# Patient Record
Sex: MALE | Race: WHITE | NOT HISPANIC OR LATINO | Employment: FULL TIME | URBAN - METROPOLITAN AREA
[De-identification: names, ages, dates, MRNs, and addresses within clinical notes are randomized per-mention and may not be internally consistent; named-entity substitution may affect disease eponyms.]

---

## 2017-01-05 ENCOUNTER — OFFICE VISIT (OUTPATIENT)
Dept: FAMILY MEDICINE CLINIC | Facility: CLINIC | Age: 47
End: 2017-01-05

## 2017-01-05 VITALS
HEART RATE: 80 BPM | SYSTOLIC BLOOD PRESSURE: 122 MMHG | TEMPERATURE: 97.9 F | OXYGEN SATURATION: 99 % | BODY MASS INDEX: 42.09 KG/M2 | HEIGHT: 70 IN | DIASTOLIC BLOOD PRESSURE: 70 MMHG | WEIGHT: 294 LBS

## 2017-01-05 DIAGNOSIS — I48.91 ATRIAL FIBRILLATION, UNSPECIFIED TYPE (HCC): Primary | ICD-10-CM

## 2017-01-05 DIAGNOSIS — E66.01 MORBID OBESITY DUE TO EXCESS CALORIES (HCC): ICD-10-CM

## 2017-01-05 DIAGNOSIS — I10 ESSENTIAL HYPERTENSION: ICD-10-CM

## 2017-01-05 DIAGNOSIS — G47.33 OBSTRUCTIVE SLEEP APNEA: ICD-10-CM

## 2017-01-05 DIAGNOSIS — R73.09 ELEVATED HEMOGLOBIN A1C: ICD-10-CM

## 2017-01-05 DIAGNOSIS — Z79.899 HIGH RISK MEDICATION USE: ICD-10-CM

## 2017-01-05 DIAGNOSIS — F41.1 GENERALIZED ANXIETY DISORDER: ICD-10-CM

## 2017-01-05 DIAGNOSIS — Z98.890 HISTORY OF RADIOFREQUENCY ABLATION PROCEDURE FOR CARDIAC ARRHYTHMIA: ICD-10-CM

## 2017-01-05 DIAGNOSIS — E88.81 METABOLIC SYNDROME: ICD-10-CM

## 2017-01-05 PROBLEM — F41.9 ANXIETY: Status: ACTIVE | Noted: 2017-01-05

## 2017-01-05 PROBLEM — E88.810 METABOLIC SYNDROME: Status: ACTIVE | Noted: 2017-01-05

## 2017-01-05 PROCEDURE — 99204 OFFICE O/P NEW MOD 45 MIN: CPT | Performed by: NURSE PRACTITIONER

## 2017-01-05 RX ORDER — APIXABAN 5 MG/1
TABLET, FILM COATED ORAL
Refills: 3 | COMMUNITY
Start: 2016-12-21 | End: 2017-07-03

## 2017-01-05 RX ORDER — DULOXETIN HYDROCHLORIDE 30 MG/1
CAPSULE, DELAYED RELEASE ORAL
Refills: 5 | COMMUNITY
Start: 2017-01-03 | End: 2017-07-03 | Stop reason: SDUPTHER

## 2017-01-05 RX ORDER — FLECAINIDE ACETATE 100 MG/1
TABLET ORAL
Refills: 3 | COMMUNITY
Start: 2017-01-03 | End: 2018-03-08 | Stop reason: SDUPTHER

## 2017-01-05 RX ORDER — DIGOXIN 250 UG/1
TABLET ORAL
Refills: 4 | COMMUNITY
Start: 2017-01-03 | End: 2017-02-17

## 2017-01-05 RX ORDER — METOPROLOL SUCCINATE 100 MG/1
TABLET, EXTENDED RELEASE ORAL
Refills: 5 | COMMUNITY
Start: 2016-12-30 | End: 2017-06-05 | Stop reason: SDUPTHER

## 2017-01-05 NOTE — PROGRESS NOTES
Subjective   Logan Landa is a 46 y.o. male.     HPI Comments: Pleasant gentleman history of atrial fibrillation  Ablation over the summer  He since has moved to [place]  Compliant with his medications  But needs a cardiologist to follow up with    Presently no chest pain no shortness of breath no rapid heart rate irregular heart rate or other problems.  He is doing well with medication and has assess with normal sinus rhythm after the ablation      Blood pressure controlled    He has anxiety most of his life  Especially last 20 years presently controlled on duloxetine  Previously Effexor     He's had some erectile problems over the last several years, prior to duloxetine and metoprolol    Saw urology testosterone checked was good    Sleep apnea is treated    Would like to eat better lose weight lost over 20 pounds last year but gained it back    Drinks at least 6 sodas a day    Had elevated hemoglobin A1c previously in prediabetes several months ago  No history diabetes    He does not smoke    Works in [employer]  His wife is not patient is well           The following portions of the patient's history were reviewed and updated as appropriate: allergies, current medications, past medical history, past social history, past surgical history and problem list.    Review of Systems   Constitutional: Negative.  Negative for appetite change, chills, fatigue, fever and unexpected weight change.   HENT: Negative for congestion, ear pain and sore throat.    Eyes: Negative.    Respiratory: Negative for cough, chest tightness, shortness of breath and wheezing.    Cardiovascular: Negative for chest pain, palpitations and leg swelling.   Gastrointestinal: Negative for abdominal pain and constipation.   Genitourinary: Negative for difficulty urinating, dysuria and urgency.   Musculoskeletal: Negative for arthralgias and myalgias.   Skin: Negative for rash and wound.   Neurological: Negative for dizziness, speech  difficulty, weakness, numbness and headaches.   Psychiatric/Behavioral: Negative for sleep disturbance. The patient is not nervous/anxious.        Objective   Physical Exam   Constitutional: He is oriented to person, place, and time. He appears well-developed and well-nourished.   HENT:   Head: Normocephalic.   Nose: Nose normal.   Mouth/Throat: Oropharynx is clear and moist.   Tm clear linnea no mass canal patent without d/c   Eyes: Conjunctivae are normal. Pupils are equal, round, and reactive to light. No scleral icterus.   Neck: Neck supple. No JVD present. No thyromegaly present.   Cardiovascular: Normal rate, regular rhythm and normal heart sounds.  Exam reveals no gallop and no friction rub.    No murmur heard.  Pulmonary/Chest: Effort normal and breath sounds normal. No stridor. No respiratory distress. He has no wheezes. He has no rales.   Abdominal: Soft. Bowel sounds are normal. He exhibits no distension. There is no tenderness.   No hepatosplenomegaly, no ascites, abdominal obesity   Musculoskeletal: He exhibits no edema or tenderness.   Lymphadenopathy:     He has no cervical adenopathy.   Neurological: He is alert and oriented to person, place, and time. He has normal reflexes.   Skin: Skin is warm and dry. No rash noted. No erythema.   Psychiatric: He has a normal mood and affect. His behavior is normal. Judgment and thought content normal.   Vitals reviewed.      Assessment/Plan   Logan was seen today for establish care.    Diagnoses and all orders for this visit:    Atrial fibrillation, unspecified type  -     Ambulatory Referral to Cardiology  -     Hemoglobin A1c  -     TSH Rfx On Abnormal To Free T4  -     Comprehensive Metabolic Panel  -     CBC & Differential  -     Lipid Panel With LDL / HDL Ratio    History of radiofrequency ablation procedure for cardiac arrhythmia  -     Ambulatory Referral to Cardiology  -     Hemoglobin A1c  -     TSH Rfx On Abnormal To Free T4  -     Comprehensive Metabolic  Panel  -     CBC & Differential  -     Lipid Panel With LDL / HDL Ratio    Generalized anxiety disorder  -     Hemoglobin A1c  -     TSH Rfx On Abnormal To Free T4  -     Comprehensive Metabolic Panel  -     CBC & Differential  -     Lipid Panel With LDL / HDL Ratio    Metabolic syndrome  -     Hemoglobin A1c  -     TSH Rfx On Abnormal To Free T4  -     Comprehensive Metabolic Panel  -     CBC & Differential  -     Lipid Panel With LDL / HDL Ratio    Elevated hemoglobin A1c  -     Hemoglobin A1c  -     TSH Rfx On Abnormal To Free T4  -     Comprehensive Metabolic Panel  -     CBC & Differential  -     Lipid Panel With LDL / HDL Ratio    Essential hypertension  -     Hemoglobin A1c  -     TSH Rfx On Abnormal To Free T4  -     Comprehensive Metabolic Panel  -     CBC & Differential  -     Lipid Panel With LDL / HDL Ratio    Morbid obesity due to excess calories  -     Hemoglobin A1c  -     TSH Rfx On Abnormal To Free T4  -     Comprehensive Metabolic Panel  -     CBC & Differential  -     Lipid Panel With LDL / HDL Ratio    Obstructive sleep apnea  -     Hemoglobin A1c  -     TSH Rfx On Abnormal To Free T4  -     Comprehensive Metabolic Panel  -     CBC & Differential  -     Lipid Panel With LDL / HDL Ratio    High risk medication use  -     Hemoglobin A1c  -     TSH Rfx On Abnormal To Free T4  -     Comprehensive Metabolic Panel  -     CBC & Differential  -     Lipid Panel With LDL / HDL Ratio  -     Digoxin Level                    Approved all medications  Digoxin Eliquis Tambocor metoprolol  Duloxetine  Reviewed medications  Patient will continue these medications  When refills needed he will call and they will be approved  He should discuss with his cardiologist after the appointment for them to take over the prescriptions      Counseling metabolic syndrome  Prediabetes  Pathophysiology involved in developing diabetes, insulin resistance as well as seeing glucose as an inflammatory agent of atherosclerotic  "disease  Metabolic syndrome as an independent risk factor for heart disease  We'll check his fasting lipids fasting labs  I will calculated 10 year cardiovascular risk, and advise on statin  As well as if he is a candidate for metformin  Emphasis of his treatment however should be healthy lifestyle,  Dietary changes is probably the single most thing he can do to improve his health by caloric restriction decreasing processed sweets  And regular exercise  He will have to tackle the habitual portion which makes losing weight and exercising so difficult initially but it becomes easier  Discharge instructions    Follow-up with cardiology    Recommend 1800- to 2200 austen a day  Average around 2000    Stretching daily, slowly build up to walking 30 minutes a day    Decrease processed sweets, carbohydrates, breads and pastas. increase natural fiber, healthy proteins.  Decrease saturated fats. Increase healthy vegetables.  Portion control all meals.  Increase water 64 ounces a day.  Avoid soda pops, juices, sugary drinks, sugar substitutes, alcohol and most \"low-fat\" products which are generally high in sugar.   If you haven't already, watch the movie \"Fed UP\", available [employer] and other sources.                            "

## 2017-01-05 NOTE — PATIENT INSTRUCTIONS
Metabolic Syndrome  Metabolic syndrome is the presence of at least three factors that increase your risk of getting cardiovascular disease and diabetes. These factors are:  · High blood sugar.  · High blood triglyceride level.  · High blood pressure.  · Low levels of good blood cholesterol (high-density lipoprotein or HDL).  · Excess weight around the waist. This factor is present with a waist measurement of:    More than 40 inches in men.    More than 35 inches in women.  Metabolic syndrome is sometimes called insulin resistance syndrome and syndrome X.  CAUSES  The exact cause is not known, but genetics and lifestyle choices play a role.  RISK FACTORS  You are more likely to develop metabolic syndrome if:  · You eat a diet high in calories and saturated fat.  · You do not exercise regularly.  · You are overweight.  · You have a family history of metabolic syndrome.  · You are .  · You are older in age.  · You have insulin resistance.  · You use any tobacco products, including cigarettes, chewing tobacco, or electronic cigarettes.  SIGNS AND SYMPTOMS  Metabolic syndrome has no specific symptoms.  DIAGNOSIS  To make a diagnosis, your health care provider will determine whether you have at least three of the factors that make up metabolic syndrome by:  · Taking your blood pressure.  · Measuring your waist.  · Ordering blood tests.  TREATMENT  Treatment may include:  · Lifestyle changes to reduce your risk for heart disease and stroke, such as:    Exercise.    Weight loss.    Maintaining a healthy diet.    Quitting the use of any tobacco products, including cigarettes, chewing tobacco, or electronic cigarettes.  · Medicines that:    Help your body to maintain glucose control.    Reduce your blood pressure and your blood triglyceride levels.  HOME CARE INSTRUCTIONS  · Exercise regularly.  · Maintain a healthy diet.  · Do not use any tobacco products, including cigarettes, chewing tobacco, or electronic cigarettes.  If you need help quitting, ask your health care provider.  · Keep all follow-up visits as directed by your health care provider. This is important.  · Measure your waist regularly and record the measurement. To measure your waist:    Stand up straight.    Breathe out.    Wrap the measuring tape around the part of your waist that is just above your hipbones.    Read the measurement.  SEEK MEDICAL CARE IF:  · You feel very tired.  · You develop excessive thirst.  · You pass large quantities of urine.  · You put on weight around your waist.  · You have headaches over and over again.  · You have a dizzy spell.  SEEK IMMEDIATE MEDICAL CARE IF:  · You develop sudden blurred vision.  · You develop a sudden dizzy spell.  · You have sudden trouble speaking or swallowing.  · You have sudden weakness in your arm or leg.  · You have chest pains or trouble breathing.  · You feel like your heartbeat is abnormal.  · You faint.     This information is not intended to replace advice given to you by your health care provider. Make sure you discuss any questions you have with your health care provider.     Document Released: 03/26/2009 Document Revised: 01/08/2016 Document Reviewed: 07/24/2015  "[employer] Interactive Patient Education ©2016 "[employer] Inc[employer]  Calorie Counting for Weight Loss  Calories are energy you get from the things you eat and drink. Your body uses this energy to keep you going throughout the day. The number of calories you eat affects your weight. When you eat more calories than your body needs, your body stores the extra calories as fat. When you eat fewer calories than your body needs, your body burns fat to get the energy it needs.  Calorie counting means keeping track of how many calories you eat and drink each day. If you make sure to eat fewer calories than your body needs, you should lose weight. In order for calorie counting to work, you will need to eat the number of calories that are right for you in a day to  lose a healthy amount of weight per week. A healthy amount of weight to lose per week is usually 1-2 lb (0.5-0.9 kg). A dietitian can determine how many calories you need in a day and give you suggestions on how to reach your calorie goal.   WHAT IS MY MY PLAN?  My goal is to have __________ calories per day.   If I have this many calories per day, I should lose around __________ pounds per week.  WHAT DO I NEED TO KNOW ABOUT CALORIE COUNTING?  In order to meet your daily calorie goal, you will need to:  · Find out how many calories are in each food you would like to eat. Try to do this before you eat.  · Decide how much of the food you can eat.  · Write down what you ate and how many calories it had. Doing this is called keeping a food log.  WHERE DO I FIND CALORIE INFORMATION?  The number of calories in a food can be found on a Nutrition Facts label. Note that all the information on a label is based on a specific serving of the food. If a food does not have a Nutrition Facts label, try to look up the calories online or ask your dietitian for help.  HOW DO I DECIDE HOW MUCH TO EAT?  To decide how much of the food you can eat, you will need to consider both the number of calories in one serving and the size of one serving. This information can be found on the Nutrition Facts label. If a food does not have a Nutrition Facts label, look up the information online or ask your dietitian for help.  Remember that calories are listed per serving. If you choose to have more than one serving of a food, you will have to multiply the calories per serving by the amount of servings you plan to eat. For example, the label on a package of bread might say that a serving size is 1 slice and that there are 90 calories in a serving. If you eat 1 slice, you will have eaten 90 calories. If you eat 2 slices, you will have eaten 180 calories.  HOW DO I KEEP A FOOD LOG?  After each meal, record the following information in your food  log:  · What you ate.  · How much of it you ate.  · How many calories it had.  · Then, add up your calories.  Keep your food log near you, such as in a small notebook in your pocket. Another option is to use a mobile chris or website. Some programs will calculate calories for you and show you how many calories you have left each time you add an item to the log.  WHAT ARE SOME CALORIE COUNTING TIPS?  · Use your calories on foods and drinks that will fill you up and not leave you hungry. Some examples of this include foods like nuts and nut butters, vegetables, lean proteins, and high-fiber foods (more than 5 g fiber per serving).  · Eat nutritious foods and avoid empty calories. Empty calories are calories you get from foods or beverages that do not have many nutrients, such as candy and soda. It is better to have a nutritious high-calorie food (such as an avocado) than a food with few nutrients (such as a bag of chips).  · Know how many calories are in the foods you eat most often. This way, you do not have to look up how many calories they have each time you eat them.  · Look out for foods that may seem like low-calorie foods but are really high-calorie foods, such as baked goods, soda, and fat-free candy.  · Pay attention to calories in drinks. Drinks such as sodas, specialty coffee drinks, alcohol, and juices have a lot of calories yet do not fill you up. Choose low-calorie drinks like water and diet drinks.  · Focus your calorie counting efforts on higher calorie items. Logging the calories in a garden salad that contains only vegetables is less important than calculating the calories in a milk shake.  · Find a way of tracking calories that works for you. Get creative. Most people who are successful find ways to keep track of how much they eat in a day, even if they do not count every calorie.  WHAT ARE SOME PORTION CONTROL TIPS?  · Know how many calories are in a serving. This will help you know how many servings  of a certain food you can have.  · Use a measuring cup to measure serving sizes. This is helpful when you start out. With time, you will be able to estimate serving sizes for some foods.  · Take some time to put servings of different foods on your favorite plates, bowls, and cups so you know what a serving looks like.  · Try not to eat straight from a bag or box. Doing this can lead to overeating. Put the amount you would like to eat in a cup or on a plate to make sure you are eating the right portion.  · Use smaller plates, glasses, and bowls to prevent overeating. This is a quick and easy way to practice portion control. If your plate is smaller, less food can fit on it.  · Try not to multitask while eating, such as watching TV or using your computer. If it is time to eat, sit down at a table and enjoy your food. Doing this will help you to start recognizing when you are full. It will also make you more aware of what and how much you are eating.  HOW CAN I CALORIE COUNT WHEN EATING OUT?  · Ask for smaller portion sizes or child-sized portions.  · Consider sharing an entree and sides instead of getting your own entree.  · If you get your own entree, eat only half. Ask for a box at the beginning of your meal and put the rest of your entree in it so you are not tempted to eat it.  · Look for the calories on the menu. If calories are listed, choose the lower calorie options.  · Choose dishes that include vegetables, fruits, whole grains, low-fat dairy products, and lean protein. Focusing on smart food choices from each of the 5 food groups can help you stay on track at restaurants.  · Choose items that are boiled, broiled, grilled, or steamed.  · Choose water, milk, unsweetened iced tea, or other drinks without added sugars. If you want an alcoholic beverage, choose a lower calorie option. For example, a regular alec can have up to 700 calories and a glass of wine has around 150.  · Stay away from items that are  "buttered, battered, fried, or served with cream sauce. Items labeled \"crispy\" are usually fried, unless stated otherwise.  · Ask for dressings, sauces, and syrups on the side. These are usually very high in calories, so do not eat much of them.  · Watch out for salads. Many people think salads are a healthy option, but this is often not the case. Many salads come with de la rosa, fried chicken, lots of cheese, fried chips, and dressing. All of these items have a lot of calories. If you want a salad, choose a garden salad and ask for grilled meats or steak. Ask for the dressing on the side, or ask for olive oil and vinegar or lemon to use as dressing.  · Estimate how many servings of a food you are given. For example, a serving of cooked rice is ½ cup or about the size of half a tennis ball or one cupcake wrapper. Knowing serving sizes will help you be aware of how much food you are eating at restaurants. The list below tells you how big or small some common portion sizes are based on everyday objects.    1 oz--4 stacked dice.    3 oz--1 deck of cards.    1 tsp--1 dice.    1 Tbsp--½ a Ping-Pong ball.    2 Tbsp--1 Ping-Pong ball.    ½ cup--1 tennis ball or 1 cupcake wrapper.    1 cup--1 baseball.     This information is not intended to replace advice given to you by your health care provider. Make sure you discuss any questions you have with your health care provider.     Document Released: 12/18/2006 Document Revised: 01/08/2016 Document Reviewed: 10/23/2014  [employer] Patient Education ©2016 [employer]      Discharge instructions    Follow-up with cardiology    Recommend 1800- to 2200 austen a day  Average around 2000    Stretching daily, slowly build up to walking 30 minutes a day    Decrease processed sweets, carbohydrates, breads and pastas. increase natural fiber, healthy proteins.  Decrease saturated fats. Increase healthy vegetables.  Portion control all meals.  Increase water 64 ounces a day.  Avoid " "soda pops, juices, sugary drinks, sugar substitutes, alcohol and most \"low-fat\" products which are generally high in sugar.   If you haven't already, watch the movie \"Fed UP\", available [employer] and other sources.              "

## 2017-01-05 NOTE — MR AVS SNAPSHOT
Logan Landa   1/5/2017 2:00 PM   Office Visit    Dept Phone:  [phone]   Encounter #:  [id number]    Provider:  James Epley, APRN   Department:  [facility]                Your Full Care Plan              Your Updated Medication List          This list is accurate as of: 1/5/17  3:07 PM.  Always use your most recent med list.                DIGOX 250 MCG tablet   Generic drug:  digoxin       DULoxetine 30 MG capsule   Commonly known as:  CYMBALTA       ELIQUIS 5 MG tablet tablet   Generic drug:  apixaban       flecainide 100 MG tablet   Commonly known as:  TAMBOCOR       metoprolol succinate  MG 24 hr tablet   Commonly known as:  TOPROL-XL               We Performed the Following     Ambulatory Referral to Cardiology     CBC & Differential     Comprehensive Metabolic Panel     Digoxin Level     Hemoglobin A1c     Lipid Panel With LDL / HDL Ratio     TSH Rfx On Abnormal To Free T4       You Were Diagnosed With        Codes Comments    Atrial fibrillation, unspecified type    -  Primary ICD-10-CM: I48.91  ICD-9-CM: 427.31     History of radiofrequency ablation procedure for cardiac arrhythmia     ICD-10-CM: Z98.890  ICD-9-CM: V15.1     Generalized anxiety disorder     ICD-10-CM: F41.1  ICD-9-CM: 300.02     Metabolic syndrome     ICD-10-CM: E88.81  ICD-9-CM: 277.7     Elevated hemoglobin A1c     ICD-10-CM: R73.09  ICD-9-CM: 790.29     Essential hypertension     ICD-10-CM: I10  ICD-9-CM: 401.9     Morbid obesity due to excess calories     ICD-10-CM: E66.01  ICD-9-CM: 278.01     Obstructive sleep apnea     ICD-10-CM: G47.33  ICD-9-CM: 327.23     High risk medication use     ICD-10-CM: Z79.899  ICD-9-CM: V58.69       Instructions    Metabolic Syndrome  Metabolic syndrome is the presence of at least three factors that increase your risk of getting cardiovascular disease and diabetes. These factors are:  · High blood sugar.  · High blood triglyceride  level.  · High blood pressure.  · Low levels of good blood cholesterol (high-density lipoprotein or HDL).  · Excess weight around the waist. This factor is present with a waist measurement of:    More than 40 inches in men.    More than 35 inches in women.  Metabolic syndrome is sometimes called insulin resistance syndrome and syndrome X.  CAUSES  The exact cause is not known, but genetics and lifestyle choices play a role.  RISK FACTORS  You are more likely to develop metabolic syndrome if:  · You eat a diet high in calories and saturated fat.  · You do not exercise regularly.  · You are overweight.  · You have a family history of metabolic syndrome.  · You are .  · You are older in age.  · You have insulin resistance.  · You use any tobacco products, including cigarettes, chewing tobacco, or electronic cigarettes.  SIGNS AND SYMPTOMS  Metabolic syndrome has no specific symptoms.  DIAGNOSIS  To make a diagnosis, your health care provider will determine whether you have at least three of the factors that make up metabolic syndrome by:  · Taking your blood pressure.  · Measuring your waist.  · Ordering blood tests.  TREATMENT  Treatment may include:  · Lifestyle changes to reduce your risk for heart disease and stroke, such as:    Exercise.    Weight loss.    Maintaining a healthy diet.    Quitting the use of any tobacco products, including cigarettes, chewing tobacco, or electronic cigarettes.  · Medicines that:    Help your body to maintain glucose control.    Reduce your blood pressure and your blood triglyceride levels.  HOME CARE INSTRUCTIONS  · Exercise regularly.  · Maintain a healthy diet.  · Do not use any tobacco products, including cigarettes, chewing tobacco, or electronic cigarettes. If you need help quitting, ask your health care provider.  · Keep all follow-up visits as directed by your health care provider. This is important.  · Measure your waist regularly and record the measurement. To measure  your waist:    Stand up straight.    Breathe out.    Wrap the measuring tape around the part of your waist that is just above your hipbones.    Read the measurement.  SEEK MEDICAL CARE IF:  · You feel very tired.  · You develop excessive thirst.  · You pass large quantities of urine.  · You put on weight around your waist.  · You have headaches over and over again.  · You have a dizzy spell.  SEEK IMMEDIATE MEDICAL CARE IF:  · You develop sudden blurred vision.  · You develop a sudden dizzy spell.  · You have sudden trouble speaking or swallowing.  · You have sudden weakness in your arm or leg.  · You have chest pains or trouble breathing.  · You feel like your heartbeat is abnormal.  · You faint.     This information is not intended to replace advice given to you by your health care provider. Make sure you discuss any questions you have with your health care provider.     Document Released: 03/26/2009 Document Revised: 01/08/2016 Document Reviewed: 07/24/2015  [employer] Interactive Patient Education ©2016 [employer]  Calorie Counting for Weight Loss  Calories are energy you get from the things you eat and drink. Your body uses this energy to keep you going throughout the day. The number of calories you eat affects your weight. When you eat more calories than your body needs, your body stores the extra calories as fat. When you eat fewer calories than your body needs, your body burns fat to get the energy it needs.  Calorie counting means keeping track of how many calories you eat and drink each day. If you make sure to eat fewer calories than your body needs, you should lose weight. In order for calorie counting to work, you will need to eat the number of calories that are right for you in a day to lose a healthy amount of weight per week. A healthy amount of weight to lose per week is usually 1-2 lb (0.5-0.9 kg). A dietitian can determine how many calories you need in a day and give you suggestions on how to  reach your calorie goal.   WHAT IS MY MY PLAN?  My goal is to have __________ calories per day.   If I have this many calories per day, I should lose around __________ pounds per week.  WHAT DO I NEED TO KNOW ABOUT CALORIE COUNTING?  In order to meet your daily calorie goal, you will need to:  · Find out how many calories are in each food you would like to eat. Try to do this before you eat.  · Decide how much of the food you can eat.  · Write down what you ate and how many calories it had. Doing this is called keeping a food log.  WHERE DO I FIND CALORIE INFORMATION?  The number of calories in a food can be found on a Nutrition Facts label. Note that all the information on a label is based on a specific serving of the food. If a food does not have a Nutrition Facts label, try to look up the calories online or ask your dietitian for help.  HOW DO I DECIDE HOW MUCH TO EAT?  To decide how much of the food you can eat, you will need to consider both the number of calories in one serving and the size of one serving. This information can be found on the Nutrition Facts label. If a food does not have a Nutrition Facts label, look up the information online or ask your dietitian for help.  Remember that calories are listed per serving. If you choose to have more than one serving of a food, you will have to multiply the calories per serving by the amount of servings you plan to eat. For example, the label on a package of bread might say that a serving size is 1 slice and that there are 90 calories in a serving. If you eat 1 slice, you will have eaten 90 calories. If you eat 2 slices, you will have eaten 180 calories.  HOW DO I KEEP A FOOD LOG?  After each meal, record the following information in your food log:  · What you ate.  · How much of it you ate.  · How many calories it had.  · Then, add up your calories.  Keep your food log near you, such as in a small notebook in your pocket. Another option is to use a mobile chris  or website. Some programs will calculate calories for you and show you how many calories you have left each time you add an item to the log.  WHAT ARE SOME CALORIE COUNTING TIPS?  · Use your calories on foods and drinks that will fill you up and not leave you hungry. Some examples of this include foods like nuts and nut butters, vegetables, lean proteins, and high-fiber foods (more than 5 g fiber per serving).  · Eat nutritious foods and avoid empty calories. Empty calories are calories you get from foods or beverages that do not have many nutrients, such as candy and soda. It is better to have a nutritious high-calorie food (such as an avocado) than a food with few nutrients (such as a bag of chips).  · Know how many calories are in the foods you eat most often. This way, you do not have to look up how many calories they have each time you eat them.  · Look out for foods that may seem like low-calorie foods but are really high-calorie foods, such as baked goods, soda, and fat-free candy.  · Pay attention to calories in drinks. Drinks such as sodas, specialty coffee drinks, alcohol, and juices have a lot of calories yet do not fill you up. Choose low-calorie drinks like water and diet drinks.  · Focus your calorie counting efforts on higher calorie items. Logging the calories in a garden salad that contains only vegetables is less important than calculating the calories in a milk shake.  · Find a way of tracking calories that works for you. Get creative. Most people who are successful find ways to keep track of how much they eat in a day, even if they do not count every calorie.  WHAT ARE SOME PORTION CONTROL TIPS?  · Know how many calories are in a serving. This will help you know how many servings of a certain food you can have.  · Use a measuring cup to measure serving sizes. This is helpful when you start out. With time, you will be able to estimate serving sizes for some foods.  · Take some time to put servings  "of different foods on your favorite plates, bowls, and cups so you know what a serving looks like.  · Try not to eat straight from a bag or box. Doing this can lead to overeating. Put the amount you would like to eat in a cup or on a plate to make sure you are eating the right portion.  · Use smaller plates, glasses, and bowls to prevent overeating. This is a quick and easy way to practice portion control. If your plate is smaller, less food can fit on it.  · Try not to multitask while eating, such as watching TV or using your computer. If it is time to eat, sit down at a table and enjoy your food. Doing this will help you to start recognizing when you are full. It will also make you more aware of what and how much you are eating.  HOW CAN I CALORIE COUNT WHEN EATING OUT?  · Ask for smaller portion sizes or child-sized portions.  · Consider sharing an entree and sides instead of getting your own entree.  · If you get your own entree, eat only half. Ask for a box at the beginning of your meal and put the rest of your entree in it so you are not tempted to eat it.  · Look for the calories on the menu. If calories are listed, choose the lower calorie options.  · Choose dishes that include vegetables, fruits, whole grains, low-fat dairy products, and lean protein. Focusing on smart food choices from each of the 5 food groups can help you stay on track at restaurants.  · Choose items that are boiled, broiled, grilled, or steamed.  · Choose water, milk, unsweetened iced tea, or other drinks without added sugars. If you want an alcoholic beverage, choose a lower calorie option. For example, a regular alec can have up to 700 calories and a glass of wine has around 150.  · Stay away from items that are buttered, battered, fried, or served with cream sauce. Items labeled \"crispy\" are usually fried, unless stated otherwise.  · Ask for dressings, sauces, and syrups on the side. These are usually very high in calories, so " "do not eat much of them.  · Watch out for salads. Many people think salads are a healthy option, but this is often not the case. Many salads come with de la rosa, fried chicken, lots of cheese, fried chips, and dressing. All of these items have a lot of calories. If you want a salad, choose a garden salad and ask for grilled meats or steak. Ask for the dressing on the side, or ask for olive oil and vinegar or lemon to use as dressing.  · Estimate how many servings of a food you are given. For example, a serving of cooked rice is ½ cup or about the size of half a tennis ball or one cupcake wrapper. Knowing serving sizes will help you be aware of how much food you are eating at restaurants. The list below tells you how big or small some common portion sizes are based on everyday objects.    1 oz--4 stacked dice.    3 oz--1 deck of cards.    1 tsp--1 dice.    1 Tbsp--½ a Ping-Pong ball.    2 Tbsp--1 Ping-Pong ball.    ½ cup--1 tennis ball or 1 cupcake wrapper.    1 cup--1 baseball.     This information is not intended to replace advice given to you by your health care provider. Make sure you discuss any questions you have with your health care provider.     Document Released: 12/18/2006 Document Revised: 01/08/2016 Document Reviewed: 10/23/2014  [employer] Interactive Patient Education ©2016 [employer]      Discharge instructions    Follow-up with cardiology    Recommend 1800- to 2200 austen a day  Average around 2000    Stretching daily, slowly build up to walking 30 minutes a day    Decrease processed sweets, carbohydrates, breads and pastas. increase natural fiber, healthy proteins.  Decrease saturated fats. Increase healthy vegetables.  Portion control all meals.  Increase water 64 ounces a day.  Avoid soda pops, juices, sugary drinks, sugar substitutes, alcohol and most \"low-fat\" products which are generally high in sugar.   If you haven't already, watch the movie \"Fed UP\", available Netflix and other " "sources.                 Patient Instructions History      Upcoming Appointments     Visit Type Date Time Department    NEW PATIENT 2017  2:00 PM [facility]    OFFICE VISIT 2017  9:40 AM [facility] [place] Signup     [facility] Loopback allows you to send messages to your doctor, view your test results, renew your prescriptions, schedule appointments, and more. To sign up, go to Onepager and click on the Sign Up Now link in the New User? box. Enter your Loopback Activation Code exactly as it appears below along with the last four digits of your Social Security Number and your Date of Birth () to complete the sign-up process. If you do not sign up before the expiration date, you must request a new code.    Loopback Activation Code: [email or web address]  Expires: 2017  3:05 PM    If you have questions, you can email "[email or web address] or call [phone] to talk to our Loopback staff. Remember, Loopback is NOT to be used for urgent needs. For medical emergencies, dial 911.               Other Info from Your Visit           Your Appointments     2017  9:40 AM EDT   Office Visit with James Epley, APRN   [facility] (--)    [address] [id number] [phone]           Arrive 15 minutes prior to appointment.              Allergies     Penicillins  Hives      Reason for Visit     Establish Care referral for cardiology      Vital Signs     Blood Pressure Pulse Temperature Height Weight Oxygen Saturation    122/70 (BP Location: Left arm, Patient Position: Sitting, Cuff Size: Large Adult) 80 97.9 °F (36.6 °C) (Oral) 70\" (177.8 cm) 294 lb (133 kg) 99%    Body Mass Index Smoking Status                42.18 kg/m2 Former Smoker          Problems and Diagnoses Noted     Anxiety problem    Generalized anxiety disorder    Atrial fibrillation (irregular heartbeat)    -  Primary    History of " radiofrequency ablation procedure for cardiac arrhythmia        Metabolic syndrome        Elevated hemoglobin A1c        High blood pressure        Severe obesity        Sleep apnea        High risk medication use

## 2017-02-17 ENCOUNTER — OFFICE VISIT (OUTPATIENT)
Dept: CARDIOLOGY | Facility: CLINIC | Age: 47
End: 2017-02-17

## 2017-02-17 VITALS
HEART RATE: 73 BPM | WEIGHT: 277 LBS | DIASTOLIC BLOOD PRESSURE: 100 MMHG | BODY MASS INDEX: 39.65 KG/M2 | SYSTOLIC BLOOD PRESSURE: 140 MMHG | HEIGHT: 70 IN

## 2017-02-17 DIAGNOSIS — E66.01 MORBID OBESITY DUE TO EXCESS CALORIES (HCC): ICD-10-CM

## 2017-02-17 DIAGNOSIS — I48.0 PAROXYSMAL ATRIAL FIBRILLATION (HCC): Primary | ICD-10-CM

## 2017-02-17 DIAGNOSIS — Z98.890 HISTORY OF RADIOFREQUENCY ABLATION PROCEDURE FOR CARDIAC ARRHYTHMIA: ICD-10-CM

## 2017-02-17 DIAGNOSIS — G47.33 OBSTRUCTIVE SLEEP APNEA: ICD-10-CM

## 2017-02-17 DIAGNOSIS — I10 ESSENTIAL HYPERTENSION: ICD-10-CM

## 2017-02-17 PROCEDURE — 99203 OFFICE O/P NEW LOW 30 MIN: CPT | Performed by: INTERNAL MEDICINE

## 2017-02-17 PROCEDURE — 93000 ELECTROCARDIOGRAM COMPLETE: CPT | Performed by: INTERNAL MEDICINE

## 2017-02-17 NOTE — PROGRESS NOTES
Date of Office Visit: 2017  Encounter Provider: Shaun Shea MD  Place of Service: [facility] CARDIOLOGY  Patient Name: Logan Landa  : 1970    Subjective:     Encounter Date:2017      Patient ID: Logan Landa is a 47 y.o. male who has a cc of  PAF and had ablation in [place] in .   No af. Had palp post procedure when weaning Flec. Went back on and relief of palp.     The patient had a good year.   No anginal chest pain,   No sig yang,   No soa,   No fainting,  No orthostasis.   No edema.   Exercise tolerance: no restrictions.     There have been no hospital admission since the last visit.     There have been no bleeding events.       Past Medical History   Diagnosis Date   • Anxiety    • Atrial fibrillation    • Elevated hemoglobin A1c    • Essential hypertension    • Generalized anxiety disorder    • High risk medication use    • Metabolic syndrome    • Morbid obesity due to excess calories    • PRECIOUS (obstructive sleep apnea)    • PAF (paroxysmal atrial fibrillation)        Social History     Social History   • Marital status:      Spouse name: N/A   • Number of children: N/A   • Years of education: N/A     Occupational History   •       Social History Main Topics   • Smoking status: Former Smoker   • Smokeless tobacco: Never Used   • Alcohol use Yes   • Drug use: No   • Sexual activity: Yes     Partners: Female     Other Topics Concern   • Not on file     Social History Narrative       Review of Systems   Constitution: Negative for fever and night sweats.   HENT: Negative for ear pain and stridor.    Eyes: Negative for discharge and visual halos.   Cardiovascular: Negative for cyanosis.   Respiratory: Negative for hemoptysis and sputum production.    Hematologic/Lymphatic: Negative for adenopathy.   Skin: Negative for nail changes and unusual hair distribution.   Musculoskeletal: Positive for back pain. Negative for gout and joint swelling.  "  Gastrointestinal: Negative for bowel incontinence and flatus.   Genitourinary: Negative for dysuria and flank pain.   Neurological: Negative for seizures and tremors.   Psychiatric/Behavioral: Negative for altered mental status. The patient is not nervous/anxious.             Objective:     Vitals:    02/17/17 1129   BP: 140/100   Pulse: 73   Weight: 277 lb (126 kg)   Height: 70\" (177.8 cm)         Physical Exam   Constitutional: He is oriented to person, place, and time.   HENT:   Head: Normocephalic and atraumatic.   Eyes: Right eye exhibits no discharge. Left eye exhibits no discharge.   Neck: No JVD present. No thyromegaly present.   Cardiovascular: Normal rate and regular rhythm.  Exam reveals no gallop and no friction rub.    No murmur heard.  Pulmonary/Chest: Effort normal and breath sounds normal. He has no rales.   Abdominal: Soft. Bowel sounds are normal. There is no tenderness.   Musculoskeletal: Normal range of motion. He exhibits no edema or deformity.   Neurological: He is alert and oriented to person, place, and time. He exhibits normal muscle tone.   Skin: Skin is warm and dry. No erythema.   Psychiatric: He has a normal mood and affect. His behavior is normal. Thought content normal.         ECG 12 Lead  Date/Time: 2/17/2017 11:49 AM  Performed by: TOO MARIN  Authorized by: TOO MARIN   Comparison: compared with previous ECG   Similar to previous ECG  Rhythm: sinus rhythm  Rate: normal  Conduction: conduction normal  ST Segments: ST segments normal  T Waves: T waves normal  QRS axis: normal  Clinical impression: normal ECG            Lab Review:       Assessment:          Diagnosis Plan   1. Paroxysmal atrial fibrillation     2. Essential hypertension     3. Obstructive sleep apnea     4. Morbid obesity due to excess calories     5. History of radiofrequency ablation procedure for cardiac arrhythmia            Plan:       The CHADSVASC score is 1   Anticoagulation apixaban  -- we had a " disc about benefits and harms and his risk profile puts him in a gray area and if he wants to stop it's totally fine.   Heart rate control bb -- stop digoxin.       For the problem of overweight/obesity, we discussed the importance of lifestyle measures and strategies for weight loss, such as improved nutrition, regular exercise and sleep hygiene.                   I spent 20 minutes or more w pt and chart.

## 2017-03-31 ENCOUNTER — TELEPHONE (OUTPATIENT)
Dept: FAMILY MEDICINE CLINIC | Facility: CLINIC | Age: 47
End: 2017-03-31

## 2017-03-31 NOTE — TELEPHONE ENCOUNTER
----- Message from Guerline Andrews MA sent at 3/31/2017 11:22 AM EDT -----  Regarding: FW: lab order      ----- Message -----     From: Sadia Antonio     Sent: 3/31/2017   8:46 AM       To: Guerline Andrews MA  Subject: lab order                                        I called the patient to reschedule his appointment on Wednesday and he remembered he was supposed to come in for labs prior to that.  So he is now on the lab schedule for Tuesday and boom' schedule for Thursday.

## 2017-06-05 RX ORDER — METOPROLOL SUCCINATE 100 MG/1
100 TABLET, EXTENDED RELEASE ORAL DAILY
Qty: 15 TABLET | Refills: 0 | Status: SHIPPED | OUTPATIENT
Start: 2017-06-05 | End: 2017-06-16 | Stop reason: SDUPTHER

## 2017-06-16 RX ORDER — METOPROLOL SUCCINATE 100 MG/1
100 TABLET, EXTENDED RELEASE ORAL DAILY
Qty: 30 TABLET | Refills: 0 | Status: SHIPPED | OUTPATIENT
Start: 2017-06-16 | End: 2017-07-03 | Stop reason: SDUPTHER

## 2017-07-03 ENCOUNTER — OFFICE VISIT (OUTPATIENT)
Dept: FAMILY MEDICINE CLINIC | Facility: CLINIC | Age: 47
End: 2017-07-03

## 2017-07-03 VITALS
SYSTOLIC BLOOD PRESSURE: 122 MMHG | HEIGHT: 70 IN | BODY MASS INDEX: 38.22 KG/M2 | HEART RATE: 72 BPM | WEIGHT: 267 LBS | DIASTOLIC BLOOD PRESSURE: 88 MMHG | OXYGEN SATURATION: 94 %

## 2017-07-03 DIAGNOSIS — Z98.890 HISTORY OF RADIOFREQUENCY ABLATION PROCEDURE FOR CARDIAC ARRHYTHMIA: ICD-10-CM

## 2017-07-03 DIAGNOSIS — G47.33 OBSTRUCTIVE SLEEP APNEA: ICD-10-CM

## 2017-07-03 DIAGNOSIS — E66.01 MORBID OBESITY DUE TO EXCESS CALORIES (HCC): ICD-10-CM

## 2017-07-03 DIAGNOSIS — F41.1 GENERALIZED ANXIETY DISORDER: ICD-10-CM

## 2017-07-03 DIAGNOSIS — I10 ESSENTIAL HYPERTENSION: Primary | ICD-10-CM

## 2017-07-03 DIAGNOSIS — R73.09 ELEVATED HEMOGLOBIN A1C: ICD-10-CM

## 2017-07-03 DIAGNOSIS — I48.91 ATRIAL FIBRILLATION, UNSPECIFIED TYPE (HCC): ICD-10-CM

## 2017-07-03 PROBLEM — M50.30 DEGENERATIVE CERVICAL DISC: Status: ACTIVE | Noted: 2017-07-03

## 2017-07-03 PROCEDURE — 99213 OFFICE O/P EST LOW 20 MIN: CPT | Performed by: NURSE PRACTITIONER

## 2017-07-03 RX ORDER — METOPROLOL SUCCINATE 100 MG/1
100 TABLET, EXTENDED RELEASE ORAL DAILY
Qty: 90 TABLET | Refills: 1 | Status: SHIPPED | OUTPATIENT
Start: 2017-07-03 | End: 2017-11-08 | Stop reason: SDUPTHER

## 2017-07-03 RX ORDER — DULOXETIN HYDROCHLORIDE 30 MG/1
30 CAPSULE, DELAYED RELEASE ORAL DAILY
Qty: 90 CAPSULE | Refills: 1 | Status: SHIPPED | OUTPATIENT
Start: 2017-07-03 | End: 2018-05-22 | Stop reason: SDUPTHER

## 2017-07-03 NOTE — PROGRESS NOTES
Subjective   Logan Landa is a 47 y.o. male.     HPI Comments: F/u htn controlled  Missed med yest?  rto fasting labs  No longer taking b;lood rthinner or dig  Ablation 2 yrs ago  \no etoh    Lip chol         The following portions of the patient's history were reviewed and updated as appropriate: allergies, current medications, past medical history, past social history, past surgical history and problem list.    Review of Systems   Constitutional: Negative.  Negative for appetite change, chills, fatigue, fever and unexpected weight change.   HENT: Negative for congestion, ear pain and sore throat.    Eyes: Negative.    Respiratory: Negative for cough, chest tightness, shortness of breath and wheezing.    Cardiovascular: Negative for chest pain, palpitations and leg swelling.   Gastrointestinal: Negative for abdominal pain and constipation.   Genitourinary: Negative for difficulty urinating, dysuria and urgency.   Musculoskeletal: Negative for arthralgias and myalgias.   Skin: Negative for rash and wound.   Neurological: Negative for dizziness, speech difficulty, weakness, numbness and headaches.   Psychiatric/Behavioral: Negative for sleep disturbance. The patient is not nervous/anxious.        Objective   Physical Exam   Constitutional: He is oriented to person, place, and time. He appears well-developed.   HENT:   Head: Normocephalic.   Eyes: Pupils are equal, round, and reactive to light.   Cardiovascular: Normal rate, regular rhythm, normal heart sounds and intact distal pulses.    Pulmonary/Chest: Effort normal and breath sounds normal.   Neurological: He is alert and oriented to person, place, and time.   Skin: Skin is warm and dry.   Psychiatric: He has a normal mood and affect. His behavior is normal. Judgment and thought content normal.   Vitals reviewed.      Assessment/Plan   Logan was seen today for follow-up.    Diagnoses and all orders for this visit:    Essential hypertension  -     metoprolol  succinate XL (TOPROL-XL) 100 MG 24 hr tablet; Take 1 tablet by mouth Daily.  -     Hemoglobin A1c  -     TSH Rfx On Abnormal To Free T4  -     Comprehensive Metabolic Panel  -     CBC & Differential  -     Lipid Panel With LDL / HDL Ratio    Morbid obesity due to excess calories  -     metoprolol succinate XL (TOPROL-XL) 100 MG 24 hr tablet; Take 1 tablet by mouth Daily.  -     Hemoglobin A1c  -     TSH Rfx On Abnormal To Free T4  -     Comprehensive Metabolic Panel  -     CBC & Differential  -     Lipid Panel With LDL / HDL Ratio    History of radiofrequency ablation procedure for cardiac arrhythmia  -     metoprolol succinate XL (TOPROL-XL) 100 MG 24 hr tablet; Take 1 tablet by mouth Daily.  -     Hemoglobin A1c  -     TSH Rfx On Abnormal To Free T4  -     Comprehensive Metabolic Panel  -     CBC & Differential  -     Lipid Panel With LDL / HDL Ratio    Atrial fibrillation, unspecified type  Comments:  Sinus rhythm since ablation per patient's history  Orders:  -     metoprolol succinate XL (TOPROL-XL) 100 MG 24 hr tablet; Take 1 tablet by mouth Daily.  -     Hemoglobin A1c  -     TSH Rfx On Abnormal To Free T4  -     Comprehensive Metabolic Panel  -     CBC & Differential  -     Lipid Panel With LDL / HDL Ratio    Obstructive sleep apnea  -     Ambulatory Referral to Sleep Medicine  -     metoprolol succinate XL (TOPROL-XL) 100 MG 24 hr tablet; Take 1 tablet by mouth Daily.  -     Hemoglobin A1c  -     TSH Rfx On Abnormal To Free T4  -     Comprehensive Metabolic Panel  -     CBC & Differential  -     Lipid Panel With LDL / HDL Ratio    Generalized anxiety disorder  -     metoprolol succinate XL (TOPROL-XL) 100 MG 24 hr tablet; Take 1 tablet by mouth Daily.  -     Hemoglobin A1c  -     TSH Rfx On Abnormal To Free T4  -     Comprehensive Metabolic Panel  -     CBC & Differential  -     Lipid Panel With LDL / HDL Ratio    Elevated hemoglobin A1c  -     metoprolol succinate XL (TOPROL-XL) 100 MG 24 hr tablet; Take 1  tablet by mouth Daily.  -     Hemoglobin A1c  -     TSH Rfx On Abnormal To Free T4  -     Comprehensive Metabolic Panel  -     CBC & Differential  -     Lipid Panel With LDL / HDL Ratio    Other orders  -     DULoxetine (CYMBALTA) 30 MG capsule; Take 1 capsule by mouth Daily.                  Vit d 3 otc 1000     iu    Monitor blood pressure  Recheck in 6 months of controlled  Continue present medication  Medications reviewed  Anxiety stable with Cymbalta continue refills

## 2017-07-05 LAB
ALBUMIN SERPL-MCNC: 4.3 G/DL (ref 3.5–5.2)
ALBUMIN/GLOB SERPL: 1.5 G/DL
ALP SERPL-CCNC: 84 U/L (ref 39–117)
ALT SERPL-CCNC: 36 U/L (ref 1–41)
AST SERPL-CCNC: 18 U/L (ref 1–40)
BASOPHILS # BLD AUTO: 0.08 10*3/MM3 (ref 0–0.2)
BASOPHILS NFR BLD AUTO: 1 % (ref 0–1.5)
BILIRUB SERPL-MCNC: 0.4 MG/DL (ref 0.1–1.2)
BUN SERPL-MCNC: 11 MG/DL (ref 6–20)
BUN/CREAT SERPL: 10.7 (ref 7–25)
CALCIUM SERPL-MCNC: 9.8 MG/DL (ref 8.6–10.5)
CHLORIDE SERPL-SCNC: 100 MMOL/L (ref 98–107)
CHOLEST SERPL-MCNC: 144 MG/DL (ref 0–200)
CO2 SERPL-SCNC: 27.4 MMOL/L (ref 22–29)
CREAT SERPL-MCNC: 1.03 MG/DL (ref 0.76–1.27)
EOSINOPHIL # BLD AUTO: 0.39 10*3/MM3 (ref 0–0.7)
EOSINOPHIL NFR BLD AUTO: 4.7 % (ref 0.3–6.2)
ERYTHROCYTE [DISTWIDTH] IN BLOOD BY AUTOMATED COUNT: 14.1 % (ref 11.5–14.5)
GLOBULIN SER CALC-MCNC: 2.8 GM/DL
GLUCOSE SERPL-MCNC: 104 MG/DL (ref 65–99)
HBA1C MFR BLD: 5.5 % (ref 4.8–5.6)
HCT VFR BLD AUTO: 45.1 % (ref 40.4–52.2)
HDLC SERPL-MCNC: 29 MG/DL (ref 40–60)
HGB BLD-MCNC: 14.8 G/DL (ref 13.7–17.6)
IMM GRANULOCYTES # BLD: 0.03 10*3/MM3 (ref 0–0.03)
IMM GRANULOCYTES NFR BLD: 0.4 % (ref 0–0.5)
LDLC SERPL CALC-MCNC: 84 MG/DL (ref 0–100)
LDLC/HDLC SERPL: 2.88 {RATIO}
LYMPHOCYTES # BLD AUTO: 2.37 10*3/MM3 (ref 0.9–4.8)
LYMPHOCYTES NFR BLD AUTO: 28.3 % (ref 19.6–45.3)
MCH RBC QN AUTO: 31.4 PG (ref 27–32.7)
MCHC RBC AUTO-ENTMCNC: 32.8 G/DL (ref 32.6–36.4)
MCV RBC AUTO: 95.6 FL (ref 79.8–96.2)
MONOCYTES # BLD AUTO: 0.86 10*3/MM3 (ref 0.2–1.2)
MONOCYTES NFR BLD AUTO: 10.3 % (ref 5–12)
NEUTROPHILS # BLD AUTO: 4.65 10*3/MM3 (ref 1.9–8.1)
NEUTROPHILS NFR BLD AUTO: 55.3 % (ref 42.7–76)
PLATELET # BLD AUTO: 267 10*3/MM3 (ref 140–500)
POTASSIUM SERPL-SCNC: 4.7 MMOL/L (ref 3.5–5.2)
PROT SERPL-MCNC: 7.1 G/DL (ref 6–8.5)
RBC # BLD AUTO: 4.72 10*6/MM3 (ref 4.6–6)
SODIUM SERPL-SCNC: 142 MMOL/L (ref 136–145)
TRIGL SERPL-MCNC: 157 MG/DL (ref 0–150)
TSH SERPL DL<=0.005 MIU/L-ACNC: 2.31 MIU/ML (ref 0.27–4.2)
VLDLC SERPL CALC-MCNC: 31.4 MG/DL (ref 5–40)
WBC # BLD AUTO: 8.38 10*3/MM3 (ref 4.5–10.7)

## 2017-08-25 ENCOUNTER — HOSPITAL ENCOUNTER (OUTPATIENT)
Dept: SLEEP MEDICINE | Facility: HOSPITAL | Age: 47
Discharge: HOME | End: 2017-08-25
Admitting: NURSE PRACTITIONER

## 2017-08-25 DIAGNOSIS — G47.33 OBSTRUCTIVE SLEEP APNEA: ICD-10-CM

## 2017-08-25 PROCEDURE — G0463 HOSPITAL OUTPT CLINIC VISIT: HCPCS

## 2017-08-28 NOTE — CONSULTS
[facility]  Telephone: [phone] / Fax: [phone] [place]  Telephone: [phone] / Fax: [phone] Sada Muhammad    Referring Physician:James Epley, APRN  PCP: James Epley, APRN    Reason for consult:  sleep apnea    Logan Landa was seen in the [facility] Disorders Center today for evaluation of sleep apnea. He was diagnosed with obstructive sleep apnea 2 years ago in [place] and has been using his CPAP device with pressures of 12 cm of water for the past 2 years.  He moved to [facility] and is here today to get established with sleep provider so that he can obtain a new DME provider and renew all supplies.  He goes to bed at 9-10 PM and awakens at 7-9 AM.  He falls asleep within 1-3 hours and awakens feeling sleepy at times.  He complains of mask leak.  His headgear is old and stretched.  He denies snoring or witnessed apneas on the CPAP device.  He managed to lose up to 25 pounds in the past 5 years.  His past medical history is significant for atrial fibrillation requiring ablation. He also has allergic rhinitis which is mostly controlled. He cannot use nasal mask due to nasal congestion. His ESS is 9 today.      Social history, he is [employer] , former smoker age [age].  Drinks 6 caffeinated drinks a day.    Review of systems, positive for nasal congestion postnasal drip and anxiety.  The rest of the review of systems is underlying sleep disorders questionnaire form.       Logan Landa  has a past medical history of Anxiety; Atrial fibrillation; Elevated hemoglobin A1c; Essential hypertension; Generalized anxiety disorder; High risk medication use; Metabolic syndrome; Morbid obesity due to excess calories; PRECIOUS (obstructive sleep apnea); and PAF (paroxysmal atrial fibrillation).    Current Medications:    Current Outpatient Prescriptions:   •  aspirin (ECOTRIN) 325 MG EC tablet, Take 1 tablet by mouth Daily., Disp: 30 tablet, Rfl:   •  atorvastatin (LIPITOR) 20  MG tablet, Take 20 mg by mouth Daily., Disp: , Rfl:   •  diltiaZEM CD (CARDIZEM CD) 120 MG 24 hr capsule, Take 120 mg by mouth Daily., Disp: , Rfl:   •  DULoxetine (CYMBALTA) 30 MG capsule, Take 1 capsule by mouth Daily., Disp: 90 capsule, Rfl: 1  •  flecainide (TAMBOCOR) 100 MG tablet, TK 1 T PO Q 12 H, Disp: , Rfl: 3  •  fluticasone (FLONASE) 50 MCG/ACT nasal spray, into each nostril., Disp: , Rfl:   •  metoprolol succinate XL (TOPROL-XL) 100 MG 24 hr tablet, Take 1 tablet by mouth Daily., Disp: 90 tablet, Rfl: 1    I have reviewed Past Medical History, Past Surgical History, Medication List, Social History and Family History as entered in Sleep Questionnaire and EPIC.               Vital Signs: Height 70 inches, weight 286 pounds, BMI 41, blood pressure 132/80, heart rate 75.            General: Alert. Cooperative. Well developed. No acute distress.           Throat: No oral lesions. No thrush. Moist mucous membranes.           Pharynx- Class IV Mallampati airway, large tongue, no evidence of redundant  lateral pharyngeal tissue             Head:  Normocephalic. Symmetrical. Atraumatic.              Nose: No septal deviation. No drainage.            Chest Wall -Normal shape. Symmetric expansion with respiration. No tenderness.             Neck:  Trachea midline.           Lungs:  Clear to auscultation bilaterally. No wheezes. No rhonchi. No rales. Respirations regular, even and unlabored.            Heart:  Regular rhythm and normal rate. Normal S1 and S2. No murmur.     Abdomen:  Soft, non-tender and non-distended. Normal bowel sounds. No masses.  Extremities:  Moves all extremities well. No edema.    Psychiatric: Normal mood and affect.      Testing, download dates 5/27/17-8/24/17, 100% compliance with average use of 7 hours and 55 minutes on set pressures 12 cm with AHI 0.5      Impression:  Obstructive sleep apnea  Morbid obesity with BMI 41  Atrial fibrillation with history of ablation  Anxiety/ depression,  on duloxetine  Allergic rhinitis, controlled    Plan:  I will request study report from [facility] pulmonary critical care, [phone].  After study report is received, we will establish him with a new local DME and replace all supplies.  He understands the importance of staying on a CPAP device given underlying atrial fibrillation.    I discussed the pathophysiology of obstructive sleep apnea with the patient.  We discussed the adverse outcomes associated with untreated sleep-disordered breathing.  Caution during activities that require prolonged concentration is strongly advised.     Thank you for allowing me to participate in your patient's care.    The patient will follow up with Dr. Gibbons in 6 months      FRANKY Martin  [facility]  Phone: [phone]      Part of this note may be an electronic transcription/translation of spoken language to printed text using the Dragon Dictation System. Some errors may exist even though the document was edited.

## 2017-08-30 DIAGNOSIS — G47.10 HYPERSOMNIA: Primary | ICD-10-CM

## 2017-09-28 ENCOUNTER — APPOINTMENT (OUTPATIENT)
Dept: SLEEP MEDICINE | Facility: HOSPITAL | Age: 47
End: 2017-09-28

## 2017-11-08 ENCOUNTER — OFFICE VISIT (OUTPATIENT)
Dept: FAMILY MEDICINE CLINIC | Facility: CLINIC | Age: 47
End: 2017-11-08

## 2017-11-08 VITALS
BODY MASS INDEX: 41.37 KG/M2 | WEIGHT: 289 LBS | TEMPERATURE: 98.1 F | HEIGHT: 70 IN | HEART RATE: 80 BPM | OXYGEN SATURATION: 99 % | DIASTOLIC BLOOD PRESSURE: 78 MMHG | SYSTOLIC BLOOD PRESSURE: 132 MMHG

## 2017-11-08 DIAGNOSIS — J20.8 ACUTE BRONCHITIS DUE TO OTHER SPECIFIED ORGANISMS: Primary | ICD-10-CM

## 2017-11-08 DIAGNOSIS — I10 ESSENTIAL HYPERTENSION: ICD-10-CM

## 2017-11-08 DIAGNOSIS — Z98.890 HISTORY OF RADIOFREQUENCY ABLATION PROCEDURE FOR CARDIAC ARRHYTHMIA: ICD-10-CM

## 2017-11-08 DIAGNOSIS — F41.1 GENERALIZED ANXIETY DISORDER: ICD-10-CM

## 2017-11-08 DIAGNOSIS — G47.33 OBSTRUCTIVE SLEEP APNEA: ICD-10-CM

## 2017-11-08 DIAGNOSIS — R05.9 COUGH: ICD-10-CM

## 2017-11-08 DIAGNOSIS — E66.01 MORBID OBESITY DUE TO EXCESS CALORIES (HCC): ICD-10-CM

## 2017-11-08 DIAGNOSIS — L98.9 SKIN LESION OF BACK: ICD-10-CM

## 2017-11-08 DIAGNOSIS — I48.91 ATRIAL FIBRILLATION, UNSPECIFIED TYPE (HCC): ICD-10-CM

## 2017-11-08 DIAGNOSIS — R73.09 ELEVATED HEMOGLOBIN A1C: ICD-10-CM

## 2017-11-08 LAB
EXPIRATION DATE: NORMAL
FLUAV AG NPH QL: NORMAL
FLUBV AG NPH QL: NORMAL
INTERNAL CONTROL: NORMAL
Lab: NORMAL

## 2017-11-08 PROCEDURE — 99213 OFFICE O/P EST LOW 20 MIN: CPT | Performed by: NURSE PRACTITIONER

## 2017-11-08 PROCEDURE — 87804 INFLUENZA ASSAY W/OPTIC: CPT | Performed by: NURSE PRACTITIONER

## 2017-11-08 RX ORDER — ATORVASTATIN CALCIUM 20 MG/1
20 TABLET, FILM COATED ORAL DAILY
Qty: 90 TABLET | Refills: 1 | Status: SHIPPED | OUTPATIENT
Start: 2017-11-08 | End: 2018-05-22 | Stop reason: SDUPTHER

## 2017-11-08 RX ORDER — METOPROLOL SUCCINATE 100 MG/1
100 TABLET, EXTENDED RELEASE ORAL DAILY
Qty: 90 TABLET | Refills: 1 | Status: SHIPPED | OUTPATIENT
Start: 2017-11-08 | End: 2017-12-22 | Stop reason: SDUPTHER

## 2017-11-08 NOTE — PROGRESS NOTES
Subjective   Logan Landa is a 47 y.o. male.     HPI Comments: Increased postnasal drip some mild myalgias  No chest pain or shortness of breath no high fever  No recent travel  Mild coughnonproductive no chest pain or shortness of breath symptoms started a couple days ago  Nothing makes better words not taking medicine for this      Blood pressures been controlled  Takes Flonase for allergies    Patient history metabolic syndrome  Borderline glucose readings suggesting prediabetes    Resume taking statin  We discussed risk-benefit 10 year cardiovascular risk which was 9%  He did well with ambulation but did gain some weight    No longer smokes but uses vapor nicotine inhalation trying to quit        URI    This is a new problem. The current episode started yesterday. The problem has been gradually worsening. There has been no fever. Associated symptoms include coughing, rhinorrhea, sneezing and a sore throat. Pertinent negatives include no abdominal pain, chest pain, congestion, dysuria, ear pain, headaches, nausea, rash or wheezing. He has tried nothing for the symptoms. The treatment provided no relief.        The following portions of the patient's history were reviewed and updated as appropriate: allergies, current medications, past medical history, past social history, past surgical history and problem list.    Review of Systems   Constitutional: Negative.  Negative for appetite change, chills, fatigue, fever and unexpected weight change.   HENT: Positive for rhinorrhea, sneezing and sore throat. Negative for congestion and ear pain.    Eyes: Negative.    Respiratory: Positive for cough. Negative for chest tightness, shortness of breath and wheezing.    Cardiovascular: Negative for chest pain, palpitations and leg swelling.   Gastrointestinal: Negative for abdominal pain, constipation and nausea.   Genitourinary: Negative for difficulty urinating, dysuria and urgency.   Musculoskeletal: Negative for  arthralgias and myalgias.   Skin: Negative for rash and wound.   Neurological: Negative for dizziness, speech difficulty, weakness, numbness and headaches.   Psychiatric/Behavioral: Negative for sleep disturbance. The patient is not nervous/anxious.        Objective   Physical Exam   Constitutional: He is oriented to person, place, and time. He appears well-developed and well-nourished.   HENT:   Head: Normocephalic.   Mouth/Throat: Oropharynx is clear and moist.   Tm clear linnea no mass canal patent without d/c  Turbinates congested   Eyes: Conjunctivae are normal. Pupils are equal, round, and reactive to light. No scleral icterus.   Neck: Neck supple. No JVD present. No thyromegaly present.   Cardiovascular: Normal rate, regular rhythm and normal heart sounds.  Exam reveals no gallop and no friction rub.    No murmur heard.  Pulmonary/Chest: Effort normal and breath sounds normal. No stridor. No respiratory distress. He has no wheezes. He has no rales.   Abdominal: Soft. Bowel sounds are normal. He exhibits no distension. There is no tenderness.   No hepatosplenomegaly, no ascites,   Musculoskeletal: He exhibits no edema or tenderness.   Lymphadenopathy:     He has no cervical adenopathy.   Neurological: He is alert and oriented to person, place, and time. He has normal reflexes.   Skin: Skin is warm and dry. No rash noted. No erythema.   Psychiatric: He has a normal mood and affect. His behavior is normal. Judgment and thought content normal.   Vitals reviewed.      Assessment/Plan   Logan was seen today for uri.    Diagnoses and all orders for this visit:    Acute bronchitis due to other specified organisms    Essential hypertension  -     metoprolol succinate XL (TOPROL-XL) 100 MG 24 hr tablet; Take 1 tablet by mouth Daily.    Morbid obesity due to excess calories  -     metoprolol succinate XL (TOPROL-XL) 100 MG 24 hr tablet; Take 1 tablet by mouth Daily.    History of radiofrequency ablation procedure for  cardiac arrhythmia  -     metoprolol succinate XL (TOPROL-XL) 100 MG 24 hr tablet; Take 1 tablet by mouth Daily.    Atrial fibrillation, unspecified type  Comments:  Sinus rhythm since ablation per patient's history  Orders:  -     metoprolol succinate XL (TOPROL-XL) 100 MG 24 hr tablet; Take 1 tablet by mouth Daily.    Obstructive sleep apnea  -     metoprolol succinate XL (TOPROL-XL) 100 MG 24 hr tablet; Take 1 tablet by mouth Daily.    Generalized anxiety disorder  -     metoprolol succinate XL (TOPROL-XL) 100 MG 24 hr tablet; Take 1 tablet by mouth Daily.    Elevated hemoglobin A1c  -     metoprolol succinate XL (TOPROL-XL) 100 MG 24 hr tablet; Take 1 tablet by mouth Daily.    Cough  -     POC Influenza A / B    Skin lesion of back  -     Ambulatory Referral to Dermatology    Other orders  -     atorvastatin (LIPITOR) 20 MG tablet; Take 1 tablet by mouth Daily. To decrease risk of cardiovascular disease and stroke                    Resume taking statin  We discussed risk-benefit 10 year cardiovascular risk which was 9%  He did well with ambulation but did gain some weight    No longer smokes but uses vapor nicotine inhalation trying to quit    Symptomatic treatment  OTC cough medicine,  Mucinex DM  Chest pain shortness of breath high fever urgent recheck  He does not have time for an EKG  Will check EKG next visit    Therapeutic lifestyle changes  Weight loss

## 2017-11-08 NOTE — PATIENT INSTRUCTIONS
Acute Bronchitis  Bronchitis is inflammation of the airways that extend from the windpipe into the lungs (bronchi). The inflammation often causes mucus to develop. This leads to a cough, which is the most common symptom of bronchitis.   In acute bronchitis, the condition usually develops suddenly and goes away over time, usually in a couple weeks. Smoking, allergies, and asthma can make bronchitis worse. Repeated episodes of bronchitis may cause further lung problems.   CAUSES  Acute bronchitis is most often caused by the same virus that causes a cold. The virus can spread from person to person (contagious) through coughing, sneezing, and touching contaminated objects.  SIGNS AND SYMPTOMS   · Cough.    · Fever.    · Coughing up mucus.    · Body aches.    · Chest congestion.    · Chills.    · Shortness of breath.    · Sore throat.    DIAGNOSIS   Acute bronchitis is usually diagnosed through a physical exam. Your health care provider will also ask you questions about your medical history. Tests, such as chest X-rays, are sometimes done to rule out other conditions.   TREATMENT   Acute bronchitis usually goes away in a couple weeks. Oftentimes, no medical treatment is necessary. Medicines are sometimes given for relief of fever or cough. Antibiotic medicines are usually not needed but may be prescribed in certain situations. In some cases, an inhaler may be recommended to help reduce shortness of breath and control the cough. A cool mist vaporizer may also be used to help thin bronchial secretions and make it easier to clear the chest.   HOME CARE INSTRUCTIONS  · Get plenty of rest.    · Drink enough fluids to keep your urine clear or pale yellow (unless you have a medical condition that requires fluid restriction). Increasing fluids may help thin your respiratory secretions (sputum) and reduce chest congestion, and it will prevent dehydration.    · Take medicines only as directed by your health care provider.  · If  you were prescribed an antibiotic medicine, finish it all even if you start to feel better.  · Avoid smoking and secondhand smoke. Exposure to cigarette smoke or irritating chemicals will make bronchitis worse. If you are a smoker, consider using nicotine gum or skin patches to help control withdrawal symptoms. Quitting smoking will help your lungs heal faster.    · Reduce the chances of another bout of acute bronchitis by washing your hands frequently, avoiding people with cold symptoms, and trying not to touch your hands to your mouth, nose, or eyes.    · Keep all follow-up visits as directed by your health care provider.    SEEK MEDICAL CARE IF:  Your symptoms do not improve after 1 week of treatment.   SEEK IMMEDIATE MEDICAL CARE IF:  · You develop an increased fever or chills.    · You have chest pain.    · You have severe shortness of breath.  · You have bloody sputum.    · You develop dehydration.  · You faint or repeatedly feel like you are going to pass out.  · You develop repeated vomiting.  · You develop a severe headache.  MAKE SURE YOU:   · Understand these instructions.  · Will watch your condition.  · Will get help right away if you are not doing well or get worse.     This information is not intended to replace advice given to you by your health care provider. Make sure you discuss any questions you have with your health care provider.     Document Released: 01/25/2006 Document Revised: 01/08/2016 Document Reviewed: 06/10/2014  [employer] Patient Education ©2017 [employer]

## 2017-12-22 DIAGNOSIS — E66.01 MORBID OBESITY DUE TO EXCESS CALORIES (HCC): ICD-10-CM

## 2017-12-22 DIAGNOSIS — G47.33 OBSTRUCTIVE SLEEP APNEA: ICD-10-CM

## 2017-12-22 DIAGNOSIS — R73.09 ELEVATED HEMOGLOBIN A1C: ICD-10-CM

## 2017-12-22 DIAGNOSIS — I10 ESSENTIAL HYPERTENSION: ICD-10-CM

## 2017-12-22 DIAGNOSIS — I48.91 ATRIAL FIBRILLATION, UNSPECIFIED TYPE (HCC): ICD-10-CM

## 2017-12-22 DIAGNOSIS — Z98.890 HISTORY OF RADIOFREQUENCY ABLATION PROCEDURE FOR CARDIAC ARRHYTHMIA: ICD-10-CM

## 2017-12-22 DIAGNOSIS — F41.1 GENERALIZED ANXIETY DISORDER: ICD-10-CM

## 2017-12-22 RX ORDER — METOPROLOL SUCCINATE 100 MG/1
TABLET, EXTENDED RELEASE ORAL
Qty: 30 TABLET | Refills: 5 | Status: SHIPPED | OUTPATIENT
Start: 2017-12-22 | End: 2018-07-11 | Stop reason: SDUPTHER

## 2018-03-09 RX ORDER — FLECAINIDE ACETATE 100 MG/1
TABLET ORAL
Qty: 60 TABLET | Refills: 0 | Status: SHIPPED | OUTPATIENT
Start: 2018-03-09 | End: 2021-08-23

## 2018-05-23 RX ORDER — DULOXETIN HYDROCHLORIDE 30 MG/1
30 CAPSULE, DELAYED RELEASE ORAL DAILY
Qty: 90 CAPSULE | Refills: 0 | Status: SHIPPED | OUTPATIENT
Start: 2018-05-23 | End: 2021-08-16

## 2018-05-23 RX ORDER — ATORVASTATIN CALCIUM 20 MG/1
TABLET, FILM COATED ORAL
Qty: 90 TABLET | Refills: 0 | Status: SHIPPED | OUTPATIENT
Start: 2018-05-23

## 2018-06-18 ENCOUNTER — OFFICE VISIT CONVERTED (OUTPATIENT)
Dept: FAMILY MEDICINE CLINIC | Age: 48
End: 2018-06-18
Attending: NURSE PRACTITIONER

## 2018-07-09 ENCOUNTER — OFFICE VISIT (OUTPATIENT)
Dept: CARDIOLOGY | Facility: CLINIC | Age: 48
End: 2018-07-09

## 2018-07-09 VITALS
BODY MASS INDEX: 37.94 KG/M2 | HEART RATE: 69 BPM | WEIGHT: 271 LBS | DIASTOLIC BLOOD PRESSURE: 82 MMHG | SYSTOLIC BLOOD PRESSURE: 122 MMHG | HEIGHT: 71 IN

## 2018-07-09 DIAGNOSIS — I48.91 ATRIAL FIBRILLATION, UNSPECIFIED TYPE (HCC): ICD-10-CM

## 2018-07-09 DIAGNOSIS — E78.5 HYPERLIPIDEMIA, UNSPECIFIED HYPERLIPIDEMIA TYPE: ICD-10-CM

## 2018-07-09 DIAGNOSIS — R94.31 ABNORMAL EKG: Primary | ICD-10-CM

## 2018-07-09 DIAGNOSIS — I10 ESSENTIAL HYPERTENSION: ICD-10-CM

## 2018-07-09 PROCEDURE — 93000 ELECTROCARDIOGRAM COMPLETE: CPT | Performed by: INTERNAL MEDICINE

## 2018-07-09 PROCEDURE — 99203 OFFICE O/P NEW LOW 30 MIN: CPT | Performed by: INTERNAL MEDICINE

## 2018-07-09 NOTE — PROGRESS NOTES
" Subjective:        CC  ESTABLISH CARE     H/O AFIB ABLATION      Logan Landa Jr. is a 48 y.o. male who  Is here for the establishment of care as well as cardiac evaluation, has a history of atrial fibrillation, status post ablation, benign essential arterial hypertension, as well as hyper cholesterolemia    Logan Landa Jr.  here for follow up with no complaints of chest pain, sob, palpitation, syncope, near syncope  No side effects with current meds  No pnd, orthopnea      Past Medical History:   Diagnosis Date   • Anxiety    • Atrial fibrillation (CMS/HCC)    • Elevated hemoglobin A1c    • Essential hypertension    • Generalized anxiety disorder    • High risk medication use    • Metabolic syndrome    • Morbid obesity due to excess calories (CMS/HCC)    • PRECIOUS (obstructive sleep apnea)    • PAF (paroxysmal atrial fibrillation) (CMS/HCC)     reports that he has been smoking Electronic Cigarette.  He has never used smokeless tobacco. He reports that he drinks alcohol. He reports that he does not use drugs.  Family History   Problem Relation Age of Onset   • Depression Mother    • Anxiety disorder Mother    • Heart attack Father    • Alcohol abuse Father    • Anxiety disorder Sister    • Depression Sister         Review of Systems  Constitutional: No wt loss, fever   Gastrointestinal: No nausea , abdominal pain  Behavioral/Psych: No insomnia or anxiety   Cardiovascular ----no chest pains or tightness in the chest. All other systems reviewed and are negative    Objective:                 Physical Exam  /82 (BP Location: Left arm, Patient Position: Sitting)   Pulse 69   Ht 180.3 cm (71\")   Wt 123 kg (271 lb)   BMI 37.80 kg/m²     General appearance: NAD, conversant   Eyes: anicteric sclerae, moist conjunctivae; no lid-lag; PERRLA   HENT: Atraumatic; oropharynx clear with moist mucous membranes and no mucosal ulcerations;  normal hard and soft palate   Neck: Trachea midline; FROM, supple, no thyromegaly or " lymphadenopathy   Lungs: CTA, with normal respiratory effort and no intercostal retractions   CV: S1-S2 regular, no murmurs, no rub, no gallop   Abdomen: Soft, non-tender; no masses or HSM   Extremities: No peripheral edema or extremity lymphadenopathy  Skin: Normal temperature, turgor and texture; no rash, ulcers or subcutaneous nodules   Psych: Appropriate affect, alert and oriented to person, place and time           Cardiographics  @  ECG 12 Lead  Date/Time: 7/9/2018 12:14 PM  Performed by: LORAN POLANCO  Authorized by: LORNA POLANCO   Comparison: not compared with previous ECG   Previous ECG: no previous ECG available  Rhythm: sinus rhythm  ST Flattening: all  Clinical impression: non-specific ECG            Echocardiogram:     Imaging  Chest x-ray: not done     Lab Review   not applicable       Current Outpatient Prescriptions:   •  atorvastatin (LIPITOR) 20 MG tablet, TAKE 1 TABLET BY MOUTH DAILY., Disp: 90 tablet, Rfl: 0  •  DULoxetine (CYMBALTA) 30 MG capsule, TAKE 1 CAPSULE BY MOUTH DAILY, Disp: 90 capsule, Rfl: 0  •  flecainide (TAMBOCOR) 100 MG tablet, TAKE 1 TABLET BY MOUTH EVERY 12 HOURS, Disp: 60 tablet, Rfl: 0  •  metoprolol succinate XL (TOPROL-XL) 100 MG 24 hr tablet, TAKE 1 TABLET BY MOUTH DAILY, Disp: 30 tablet, Rfl: 5        Assessment:      No diagnosis found.    Patient Active Problem List   Diagnosis   • Anxiety   • Generalized anxiety disorder   • Elevated hemoglobin A1c   • Metabolic syndrome   • History of radiofrequency ablation procedure for cardiac arrhythmia   • Atrial fibrillation (CMS/HCC)   • Essential hypertension   • Morbid obesity due to excess calories (CMS/HCC)   • Obstructive sleep apnea   • High risk medication use   • Degenerative cervical disc         Plan:          1. Abnormal EKG  Considering the patient's symptoms as well as clinical situation and  EKG findings, along with cardiac risk factors, ischemic workup is necessary to rule out ischemic  cardiomyopathy, stress induced arrhythmias, and functional capacity for diagnosis as well as prognostic consideration    - Stress Test With Myocardial Perfusion One Day  - Adult Transthoracic Echo Complete W/ Cont if Necessary Per Protocol  - Holter Monitor - 24 Hour    2. Atrial fibrillation, unspecified type (CMS/HCC)  Considering patient's medical condition as well as the risk factors, patient will require echocardiogram for further evaluation for the LV function, four-chamber evaluation, including the pressures, valvular function and  pericardial disease and pericardial effusion      3. Essential hypertension  The blood pressure under control    4. Hyperlipidemia, unspecified hyperlipidemia type  Counseling has been done      Pros and cons of ASA in primary and secondary prevention of CAD has been discussed.  Risks of bleeding and other possible side effects have been discussed, Diff advantages and disadvantages of 325 vs 81  Mg of ASA were discussed, at this stage it has been recommended to start ASA 81 mg /day       ETT, ECHO, HOLTER    SEE 1 MONTH        Counseling was given to Logan Landa Jr. for the following topics:  risk factor reductions, prognosis and risks and benefits of treatment options .       SMOKING COUNSELING:    Ready to quit: Not Answered  Counseling given: Yes      .  EMR Dragon/Transcription disclaimer:   Much of this encounter note is an electronic transcription/translation of spoken language to printed text. The electronic translation of spoken language may permit erroneous, or at times, nonsensical words or phrases to be inadvertently transcribed; Although I have reviewed the note for such errors, some may still exist.

## 2018-07-11 DIAGNOSIS — I48.91 ATRIAL FIBRILLATION, UNSPECIFIED TYPE (HCC): ICD-10-CM

## 2018-07-11 DIAGNOSIS — R73.09 ELEVATED HEMOGLOBIN A1C: ICD-10-CM

## 2018-07-11 DIAGNOSIS — G47.33 OBSTRUCTIVE SLEEP APNEA: ICD-10-CM

## 2018-07-11 DIAGNOSIS — Z98.890 HISTORY OF RADIOFREQUENCY ABLATION PROCEDURE FOR CARDIAC ARRHYTHMIA: ICD-10-CM

## 2018-07-11 DIAGNOSIS — F41.1 GENERALIZED ANXIETY DISORDER: ICD-10-CM

## 2018-07-11 DIAGNOSIS — E66.01 MORBID OBESITY DUE TO EXCESS CALORIES (HCC): ICD-10-CM

## 2018-07-11 DIAGNOSIS — I10 ESSENTIAL HYPERTENSION: ICD-10-CM

## 2018-07-11 RX ORDER — METOPROLOL SUCCINATE 100 MG/1
TABLET, EXTENDED RELEASE ORAL
Qty: 30 TABLET | Refills: 0 | Status: SHIPPED | OUTPATIENT
Start: 2018-07-11 | End: 2021-10-20

## 2018-07-15 PROBLEM — E78.5 HYPERLIPIDEMIA: Status: ACTIVE | Noted: 2018-07-15

## 2018-07-15 PROBLEM — R94.31 ABNORMAL EKG: Status: ACTIVE | Noted: 2018-07-15

## 2018-08-02 ENCOUNTER — APPOINTMENT (OUTPATIENT)
Dept: CARDIOLOGY | Facility: HOSPITAL | Age: 48
End: 2018-08-02
Attending: INTERNAL MEDICINE

## 2018-08-02 ENCOUNTER — HOSPITAL ENCOUNTER (OUTPATIENT)
Dept: CARDIOLOGY | Facility: HOSPITAL | Age: 48
Discharge: HOME | End: 2018-08-02
Attending: INTERNAL MEDICINE

## 2018-12-17 ENCOUNTER — OFFICE VISIT CONVERTED (OUTPATIENT)
Dept: FAMILY MEDICINE CLINIC | Age: 48
End: 2018-12-17
Attending: NURSE PRACTITIONER

## 2019-03-20 ENCOUNTER — OFFICE VISIT CONVERTED (OUTPATIENT)
Dept: FAMILY MEDICINE CLINIC | Age: 49
End: 2019-03-20
Attending: NURSE PRACTITIONER

## 2019-03-20 ENCOUNTER — HOSPITAL ENCOUNTER (OUTPATIENT)
Dept: OTHER | Facility: HOSPITAL | Age: 49
Discharge: HOME | End: 2019-03-20
Attending: NURSE PRACTITIONER

## 2019-03-20 LAB
PSA SERPL-MCNC: 0.52 NG/ML (ref 0–4)
T4 FREE SERPL-MCNC: 0.9 NG/DL (ref 0.9–1.8)
TSH SERPL-ACNC: 1.69 M[IU]/L (ref 0.27–4.2)

## 2019-03-23 LAB — CONV ANTI MICROSOMAL AB: 184 IU/ML (ref 0–34)

## 2019-08-08 ENCOUNTER — OFFICE VISIT CONVERTED (OUTPATIENT)
Dept: FAMILY MEDICINE CLINIC | Age: 49
End: 2019-08-08
Attending: NURSE PRACTITIONER

## 2020-04-01 ENCOUNTER — OFFICE VISIT CONVERTED (OUTPATIENT)
Dept: FAMILY MEDICINE CLINIC | Age: 50
End: 2020-04-01
Attending: NURSE PRACTITIONER

## 2020-04-16 ENCOUNTER — OFFICE VISIT CONVERTED (OUTPATIENT)
Dept: FAMILY MEDICINE CLINIC | Age: 50
End: 2020-04-16
Attending: NURSE PRACTITIONER

## 2020-05-06 ENCOUNTER — CONVERSION ENCOUNTER (OUTPATIENT)
Dept: OTHER | Facility: HOSPITAL | Age: 50
End: 2020-05-06

## 2020-05-06 ENCOUNTER — OFFICE VISIT CONVERTED (OUTPATIENT)
Dept: CARDIOLOGY | Facility: CLINIC | Age: 50
End: 2020-05-06
Attending: INTERNAL MEDICINE

## 2020-05-20 ENCOUNTER — CONVERSION ENCOUNTER (OUTPATIENT)
Dept: CARDIOLOGY | Facility: CLINIC | Age: 50
End: 2020-05-20
Attending: INTERNAL MEDICINE

## 2020-05-20 ENCOUNTER — HOSPITAL ENCOUNTER (OUTPATIENT)
Dept: OTHER | Facility: HOSPITAL | Age: 50
Discharge: HOME | End: 2020-05-20
Attending: NURSE PRACTITIONER

## 2020-05-20 LAB
ALBUMIN SERPL-MCNC: 4.2 G/DL (ref 3.5–5)
ALBUMIN/GLOB SERPL: 1.4 {RATIO} (ref 1.4–2.6)
ALP SERPL-CCNC: 109 U/L (ref 56–119)
ALT SERPL-CCNC: 38 U/L (ref 10–40)
ANION GAP SERPL CALC-SCNC: 17 MMOL/L (ref 8–19)
AST SERPL-CCNC: 39 U/L (ref 15–50)
BASOPHILS # BLD MANUAL: 0.08 10*3/UL (ref 0–0.2)
BASOPHILS NFR BLD MANUAL: 0.7 % (ref 0–3)
BILIRUB SERPL-MCNC: 0.35 MG/DL (ref 0.2–1.3)
BUN SERPL-MCNC: 15 MG/DL (ref 5–25)
BUN/CREAT SERPL: 14 {RATIO} (ref 6–20)
CALCIUM SERPL-MCNC: 9.3 MG/DL (ref 8.7–10.4)
CHLORIDE SERPL-SCNC: 102 MMOL/L (ref 99–111)
CHOLEST SERPL-MCNC: 126 MG/DL (ref 107–200)
CHOLEST/HDLC SERPL: 3.8 {RATIO} (ref 3–6)
CONV CO2: 23 MMOL/L (ref 22–32)
CONV TOTAL PROTEIN: 7.3 G/DL (ref 6.3–8.2)
CREAT UR-MCNC: 1.09 MG/DL (ref 0.7–1.2)
DEPRECATED RDW RBC AUTO: 47.9 FL
EOSINOPHIL # BLD MANUAL: 0.33 10*3/UL (ref 0–0.7)
EOSINOPHIL NFR BLD MANUAL: 3.1 % (ref 0–7)
ERYTHROCYTE [DISTWIDTH] IN BLOOD BY AUTOMATED COUNT: 13.5 % (ref 11.5–14.5)
GFR SERPLBLD BASED ON 1.73 SQ M-ARVRAT: >60 ML/MIN/{1.73_M2}
GLOBULIN UR ELPH-MCNC: 3.1 G/DL (ref 2–3.5)
GLUCOSE SERPL-MCNC: 103 MG/DL (ref 70–99)
GRANS (ABSOLUTE): 7.41 10*3/UL (ref 2–8)
GRANS: 69.1 % (ref 30–85)
HBA1C MFR BLD: 14.9 G/DL (ref 14–18)
HCT VFR BLD AUTO: 46.2 % (ref 42–52)
HDLC SERPL-MCNC: 33 MG/DL (ref 40–60)
IMM GRANULOCYTES # BLD: 0.03 10*3/UL (ref 0–0.54)
IMM GRANULOCYTES NFR BLD: 0.3 % (ref 0–0.43)
LDLC SERPL CALC-MCNC: 66 MG/DL (ref 70–100)
LYMPHOCYTES # BLD MANUAL: 2 10*3/UL (ref 1–5)
LYMPHOCYTES NFR BLD MANUAL: 8.1 % (ref 3–10)
MCH RBC QN AUTO: 30.7 PG (ref 27–31)
MCHC RBC AUTO-ENTMCNC: 32.3 G/DL (ref 33–37)
MCV RBC AUTO: 95.1 FL (ref 80–96)
MONOCYTES # BLD AUTO: 0.87 10*3/UL (ref 0.2–1.2)
OSMOLALITY SERPL CALC.SUM OF ELEC: 285 MOSM/KG (ref 273–304)
PLATELET # BLD AUTO: 275 10*3/UL (ref 130–400)
PMV BLD AUTO: 9.7 FL (ref 7.4–10.4)
POTASSIUM SERPL-SCNC: 5.3 MMOL/L (ref 3.5–5.3)
PSA SERPL-MCNC: 0.57 NG/ML (ref 0–4)
RBC # BLD AUTO: 4.86 10*6/UL (ref 4.7–6.1)
SODIUM SERPL-SCNC: 137 MMOL/L (ref 135–147)
T4 FREE SERPL-MCNC: 1 NG/DL (ref 0.9–1.8)
TRIGL SERPL-MCNC: 137 MG/DL (ref 40–150)
TSH SERPL-ACNC: 1.25 M[IU]/L (ref 0.27–4.2)
VARIANT LYMPHS NFR BLD MANUAL: 18.7 % (ref 20–45)
VLDLC SERPL-MCNC: 27 MG/DL (ref 5–37)
WBC # BLD AUTO: 10.72 10*3/UL (ref 4.8–10.8)

## 2020-05-21 LAB — CONV ANTI MICROSOMAL AB: 64 IU/ML (ref 0–34)

## 2020-09-24 ENCOUNTER — OFFICE VISIT CONVERTED (OUTPATIENT)
Dept: FAMILY MEDICINE CLINIC | Age: 50
End: 2020-09-24
Attending: NURSE PRACTITIONER

## 2020-11-20 ENCOUNTER — OFFICE VISIT CONVERTED (OUTPATIENT)
Dept: FAMILY MEDICINE CLINIC | Age: 50
End: 2020-11-20
Attending: NURSE PRACTITIONER

## 2020-11-20 ENCOUNTER — HOSPITAL ENCOUNTER (OUTPATIENT)
Dept: OTHER | Facility: HOSPITAL | Age: 50
Discharge: HOME | End: 2020-11-20
Attending: NURSE PRACTITIONER

## 2020-11-22 LAB — SARS-COV-2 RNA SPEC QL NAA+PROBE: NOT DETECTED

## 2021-04-22 ENCOUNTER — OFFICE VISIT CONVERTED (OUTPATIENT)
Dept: FAMILY MEDICINE CLINIC | Age: 51
End: 2021-04-22
Attending: NURSE PRACTITIONER

## 2021-05-10 NOTE — H&P
History and Physical      Patient Name: Logan Landa   Patient ID: [id number]   Sex: Male   YOB: 1970    Primary Care Provider: Michell WILKINS   Referring Provider: Michell WILKINS    Visit Date: May 6, 2020    Provider: Franklyn Kenyon MD   Location: [facility]   Location Address: [address] [place] [address] [facility]  [address]  [place], [place]  [id number]   Location Phone: [phone]          Chief Complaint  · Paroxysmal atrial fibrillation   · Establish cardiac care       History Of Present Illness  Consult requested by: Michell WILKINS   Logan Landa is a 50-year-old male with atrial fibrillation. Patient was diagnosed with atrial fibrillation back in 2016. He was tried on medication for rate control. However, it did not work. Subsequently the patient underwent ablation procedure in 2016 in [place]. He was continued on Flecainide and Metoprolol. The anti-coagulation was stopped 3 months after the ablation. Since then, the patient has no major complaints. He denies having any chest pain, tightness or pressure. No palpitations. No dizziness. No syncopal episodes. He is taking all the medicines as prescribed. He moved to the [facility] in 2016 and has not seen a cardiologist since then. He still takes Flecainide twice daily.   PAST MEDICAL HISTORY: (1) Atrial fibrillation, s/p ablation in 2016, on long-term Flecainide. (2) Hyperlipidemia. (3) Negative for documented coronary artery disease.   PSYCHOSOCIAL HISTORY: Previous tobacco-user. Denies alcohol use. No recreational drug usage. No mood changes or depression.   FAMILY HISTORY: was reviewed. No family history of premature coronary artery disease.   CURRENT MEDICATIONS: include Metoprolol  mg daily; Flecainide 100 mg b.i.d; Atorvastatin 20 mg (none for 2 months); Duloxetine 60 mg daily; Goody powder. The dosage and frequency of the medications were reviewed with the patient.   ALLERGIES: Penicillin.  "      Review of Systems  · Constitutional  o Admits  o : good general health lately  o Denies  o : fatigue, recent weight changes   · Eyes  o Denies  o : double vision  · HENT  o Admits  o : hearing loss or ringing, chronic sinus problem  o Denies  o : swollen glands in neck  · Cardiovascular  o Admits  o : shortness of breath while walking or lying flat  o Denies  o : chest pain, palpitations (fast, fluttering, or skipping beats), swelling (feet, ankles, hands)  · Respiratory  o Denies  o : chronic or frequent cough, asthma or wheezing, COPD  · Gastrointestinal  o Denies  o : ulcers, nausea or vomiting  · Neurologic  o Admits  o : headaches  o Denies  o : lightheaded or dizzy, stroke  · Musculoskeletal  o Admits  o : back pain  o Denies  o : joint pain  · Endocrine  o Admits  o : thyroid disease, heat or cold intolerance  o Denies  o : diabetes, excessive thirst or urination  · Heme-Lymph  o Denies  o : bleeding or bruising tendency, anemia      Vitals  Date Time BP Position Site L\R Cuff Size HR RR TEMP (F) WT  HT  BMI kg/m2 BSA m2 O2 Sat        05/06/2020 02:01 /88 Sitting    71 - R   268lbs 6oz 5'  10\" 38.51 2.45           Physical Examination  · Constitutional  o Appearance  o : Awake, alert, in no acute distress.  · Head and Face  o HEENT  o : No pallor, anicteric. Eyes normal. Moist mucous membranes.  · Neck  o Inspection/Palpation  o : Supple. No hepatosplenomegaly.  o Jugular Veins  o : No JVD. No carotid bruits.  · Respiratory  o Auscultation of Lungs  o : Clear to auscultation bilaterally. No crackles or wheezing.  · Cardiovascular  o Heart  o : S1, S2 is normally heard. No S3. No murmur, rubs, or gallops.  · Gastrointestinal  o Abdominal Examination  o : Soft, non-distended. No palpable hepatosplenomegaly. Bowel sounds heard in all four quadrants.  · Musculoskeletal  o General  o : Normal muscle tone and strength.  · Skin and Subcutaneous Tissue  o General Inspection  o : No skin " rashes.  · Extremities  o Extremities  o : Trace pitting pedal edema bilaterally. Distal pulses present.     EKG was performed in the office today.  Indication:       atrial fibrillation.  Results:          sinus rhythm, left axis deviation.  Abnormal EKG.  Comparison:   When compared to EKG on 08/08/2019, there are no changes.    Labs done on 03/20/2019 show TSH of 1.69.  , TC 93, LDL 40, HDL 31.  Sodium 142, potassium 4.7, BUN 10, creatinine 1.0.  Calcium 9.2, total bilirubin 0.42.  AST 22, ALT 21.             Assessment     ASSESSMENT AND PLAN:    1.  Paroxysmal atrial fibrillation:  Status post ablation 4 years back, currently in sinus rhythm with no symptoms.       Today's EKG showed acceptable intervals.  Continue Flecainide and Metoprolol.  Will check an        echocardiogram to assess the left ventricular function and to rule out any significant valvular abnormalities.  2.  Will follow the echocardiogram report; otherwise, follow up in the cardiology clinic in one year.    MD SG Anderson/rigo           This note was transcribed by Mervat Morrissey.  rigo/SG  The above service was transcribed by Mervat Morrissey, and I attest to the accuracy of the note.  JV               Electronically Signed by: Mervat Morrissey-, -Author on May 12, 2020 12:42:43 PM  Electronically Co-signed by: Franklyn Kenyon MD -Reviewer on May 14, 2020 10:40:48 AM

## 2021-05-10 NOTE — PROCEDURES
"   Procedure Note      Patient Name: Logan Landa   Patient ID: [id number]   Sex: Male   YOB: 1970    Primary Care Provider: Michell WILKINS   Referring Provider: Michell WILKINS    Visit Date: May 20, 2020    Provider: Franklyn Kenyon MD   Location: [facility]   Location Address: [address] [place] [address] [facility]  [address]  [place], [place]  [id number]   Location Phone: [phone]          FINAL REPORT   TRANSTHORACIC ECHOCARDIOGRAM REPORT    Diagnosis: Atrial fibrillation.   Height: 5'10\" Weight: 268 B/P: 133/88 BSA: 2.4   Tech: JTW   MEASUREMENTS:  RVID (Diastole) : RVID. (NORMAL: 0.7 to 2.4 cm max)   LVID (Systole): 2.8 cm (Diastole): 4.3 cm . (NORMAL: 3.7 - 5.4 cm)   Posterior Wall Thickness (Diastole): 1.0 cm. (NORMAL: 0.8 - 1.1 cm)   Septal Thickness (Diastole): 0.9 cm. (NORMAL: 0.7 - 1.2 cm)   LAID (Systole): 3.5 cm. (NORMAL: 1.9 - 3.8 cm)   Aortic Root Diameter (Diastole): 3.5 cm. (NORMAL: 2.0 - 3.7 cm)   COMMENTS:  The patient underwent 2-D, M-Mode, and Doppler examination, including pulse-wave, continuous-wave, and color-flow Doppler analysis; the study is technically adequate. The following findings were noted:   FINDINGS:  MITRAL VALVE: Normal in appearance, opens well. No evidence of mitral valve prolapse. No mitral stenosis. Trace mitral regurgitation.   AORTIC VALVE: Normal trileaflet aortic valve, opens well. No evidence of aortic stenosis or regurgitation on Doppler examination.   TRICUSPID VALVE: Normal in appearance, opens well. Trace tricuspid regurgitation. Normal pulmonary artery systolic pressure.   PULMONIC VALVE: Grossly normal.   AORTIC ROOT: Normal in size with adequate motion.   LEFT ATRIUM: Normal in size. No intracavitary masses or clots seen. LA volume index is 23 mL/m2.   LEFT VENTRICLE: The left ventricular chamber size is normal. The left ventricular wall thickness is normal. Left ventricular systolic function is normal. Estimated LV ejection fraction " is 55%. There are no regional wall motion abnormalities. Left ventricular diastolic function is normal.   RIGHT VENTRICLE: Normal size and function.   RIGHT ATRIUM: Normal in size.   PERICARDIUM: No evidence of pericardial effusion.   INFERIOR VENA CAVA: Measures 1.4 cm in diameter and there is more than 50% collapse during inspiration.   DOPPLER: E/A ratio is 1.5. DT= 175 msec. IVRT is 103 msec. E/E' is 9.   Faxed: 05/26/2020      CONCLUSION:  1.  Normal left ventricular systolic function with estimated LV ejection fraction of 55%.   2.  Normal diastolic function of the left ventricle.   3.  No hemodynamically significant valvular abnormalities.      MD SG Anderson/vic    This note was transcribed by Salena Hamilton.  vic/sg  The above service was transcribed by Salena Hamilton, and I attest to the accuracy of the note.  SG                 Electronically Signed by: Deborah Hamilton-, Other -Author on May 26, 2020 08:32:11 AM  Electronically Co-signed by: Franklyn Kenyon MD -Reviewer on May 26, 2020 09:08:21 AM

## 2021-05-15 VITALS
BODY MASS INDEX: 38.42 KG/M2 | DIASTOLIC BLOOD PRESSURE: 88 MMHG | HEIGHT: 70 IN | WEIGHT: 268.37 LBS | HEART RATE: 71 BPM | SYSTOLIC BLOOD PRESSURE: 133 MMHG

## 2021-05-18 ENCOUNTER — HOSPITAL ENCOUNTER (OUTPATIENT)
Dept: OTHER | Facility: HOSPITAL | Age: 51
Discharge: HOME | End: 2021-05-18
Attending: NURSE PRACTITIONER

## 2021-05-18 LAB
ALBUMIN SERPL-MCNC: 4.2 G/DL (ref 3.5–5)
ALBUMIN/GLOB SERPL: 1.3 {RATIO} (ref 1.4–2.6)
ALP SERPL-CCNC: 107 U/L (ref 56–119)
ALT SERPL-CCNC: 30 U/L (ref 10–40)
ANION GAP SERPL CALC-SCNC: 20 MMOL/L (ref 8–19)
AST SERPL-CCNC: 30 U/L (ref 15–50)
BILIRUB SERPL-MCNC: 0.31 MG/DL (ref 0.2–1.3)
BUN SERPL-MCNC: 13 MG/DL (ref 5–25)
BUN/CREAT SERPL: 12 {RATIO} (ref 6–20)
CALCIUM SERPL-MCNC: 9.3 MG/DL (ref 8.7–10.4)
CHLORIDE SERPL-SCNC: 102 MMOL/L (ref 99–111)
CHOLEST SERPL-MCNC: 93 MG/DL (ref 107–200)
CHOLEST/HDLC SERPL: 2.7 {RATIO} (ref 3–6)
CONV CO2: 25 MMOL/L (ref 22–32)
CONV TOTAL PROTEIN: 7.5 G/DL (ref 6.3–8.2)
CREAT UR-MCNC: 1.05 MG/DL (ref 0.7–1.2)
GFR SERPLBLD BASED ON 1.73 SQ M-ARVRAT: >60 ML/MIN/{1.73_M2}
GLOBULIN UR ELPH-MCNC: 3.3 G/DL (ref 2–3.5)
GLUCOSE SERPL-MCNC: 109 MG/DL (ref 70–99)
HDLC SERPL-MCNC: 35 MG/DL (ref 40–60)
LDLC SERPL CALC-MCNC: 39 MG/DL (ref 70–100)
OSMOLALITY SERPL CALC.SUM OF ELEC: 295 MOSM/KG (ref 273–304)
POTASSIUM SERPL-SCNC: 4.5 MMOL/L (ref 3.5–5.3)
PSA SERPL-MCNC: 0.67 NG/ML (ref 0–4)
SODIUM SERPL-SCNC: 142 MMOL/L (ref 135–147)
TRIGL SERPL-MCNC: 96 MG/DL (ref 40–150)
VLDLC SERPL-MCNC: 19 MG/DL (ref 5–37)

## 2021-05-18 NOTE — PROGRESS NOTES
Logan Landa 1970     Office/Outpatient Visit    Visit Date: Mon, Jun 18, 2018 04:28 pm    Provider: Michell Villanueva N.P. (Assistant: Naomy Michele)    Location: [facility]        Electronically signed by Michell Villanueva N.P. on  06/18/2018 10:11:58 PM                             SUBJECTIVE:        CC:     Mr. Landa is a 48 year old male.  This is his first visit to the clinic.  Establish new primary care, previous PCP was James Epley, APRN         HPI: Manuelito is here today to establish care since moving from [place]. He was previously seeing James Epley, APRN.    He reports history of afib for which he had cardiac ablation in 2016. Has not seen cardiologist for awhile. He would like to see Dr. Armstrong as that is who his wife sees. He is currently on Flecainide and Metoprolol. Denies palpitations or chest pain.     He also reports history of sleep apnea for which he uses CPAP.    History of anxiety. Doing well on Duloxetine. Was previously on Effexor but this was changed after his afib diagnosis.     He reports that on previous labs, he has had low HDL cholesterol. Also with elevated blood sugars in the past and was advised that he may have to start prediabetes medications if no improvement.     Recent tib/fib fracture. Following with [facility].         ROS:     CONSTITUTIONAL:  Negative for chills and fever.      EYES:  Negative for blurred vision and eye drainage.      E/N/T:  Negative for ear pain and sore throat.      CARDIOVASCULAR:  Negative for chest pain and palpitations.      RESPIRATORY:  Negative for dyspnea and frequent wheezing.      GASTROINTESTINAL:  Negative for abdominal pain and vomiting.      NEUROLOGICAL:  Negative for dizziness and headaches.      ENDOCRINE:  Negative for polydipsia and polyphagia.      PSYCHIATRIC:  Negative for depression and suicidal thoughts.          PMH/FMH/SH:     Last Reviewed on 6/18/2018 04:59 PM by Michell Villanueva    Past Medical History:              PAST MEDICAL HISTORY         Atrial Fibrillation: cardiac ablation in 2016;     Sleep Apnea: uses CPAP;     Irritable Bowel Syndrome     renal failure after heat stroke, but no problems since     broken tib/fib, ankle surgeries     Anxiety     benign mole removal         CURRENT MEDICAL PROVIDERS:    Orthopedist: Dr. CARPENTER         PREVENTIVE HEALTH MAINTENANCE             COLORECTAL CANCER SCREENING:; colonoscopy with normal results; [facility]         Surgical History:         sugery on crushed left pinky finger;      cardiac ablation for afib;         Family History:     Father: Cause of death was lung CA;  Arrhythmia ( afib )     Mother: melanoma         Social History:     Occupation: Former ,  [employer]     Marital Status:      Children: 3 children and custody of 4 grandchildren         Tobacco/Alcohol/Supplements:     Last Reviewed on 6/18/2018 04:59 PM by Michell Villanueva    Tobacco: Current Smoker: He currently smokes every day, E-Cigarette.  Non-drinker         Substance Abuse History:     Last Reviewed on 6/18/2018 04:59 PM by Michell Villanueva    None         Mental Health History:     Last Reviewed on 6/18/2018 04:59 PM by Michell Villanueva         Communicable Diseases (eg STDs):     Last Reviewed on 6/18/2018 04:59 PM by Michell Villanueva    Reportable health conditions; NEGATIVE             Current Problems:     Last Reviewed on 6/18/2018 04:59 PM by Michell Villanueva    Anxiety     Paroxysmal atrial fibrillation     Screening for thyroid disorder     Screening for cardiovascular conditions         Immunizations:     None        Allergies:     Last Reviewed on 6/18/2018 04:59 PM by Michell Villanueva    Penicillins:    Clindamycin HCl:        Current Medications:     Last Reviewed on 6/18/2018 04:59 PM by Michell Villanueva    Duloxetine HCl 30mg Capsules, Delayed Release 1 capsule daily     Atorvastatin Calcium 20mg Tablet 1 tab daily     Flecainide Acetate 100mg Tablet 1 tab bid      Goody Powder - 1 PO QD     Metoprolol Succinate 100mg Tablets, Extended Release Take 1 tablet by mouth daily         OBJECTIVE:        Vitals:         Current: 6/18/2018 4:33:04 PM    Ht:  5 ft, 11 in;  Wt: 280.8 lbs;  BMI: 39.2    T: 97.8 F;  BP: 105/71 mm Hg (left arm, sitting);  P: 81 bpm (left arm (BP Cuff), sitting)        Exams:     PHYSICAL EXAM:     GENERAL: well developed, well nourished;  no apparent distress;     EYES: PERRL, EOMI     E/N/T: EARS: external auditory canal normal;  bilateral TMs are normal;  NOSE: normal turbinates; no sinus tenderness; OROPHARYNX: oral mucosa is normal; posterior pharynx shows no exudate;     RESPIRATORY: normal respiratory rate and pattern with no distress; normal breath sounds with no rales, rhonchi, wheezes or rubs;     CARDIOVASCULAR: normal rate; rhythm is regular;     GASTROINTESTINAL: nontender; normal bowel sounds; no organomegaly;     MUSCULOSKELETAL: LLE in brace;     NEUROLOGIC: mental status: alert and oriented x 3; GROSSLY INTACT     PSYCHIATRIC: appropriate affect and demeanor;         ASSESSMENT:           427.31   I48.0  Paroxysmal atrial fibrillation              DDx:     300.02   F41.9  Anxiety              DDx:     V81.2   Z13.6  Screening for cardiovascular conditions              DDx:     V77.0   Z13.29  Screening for thyroid disorder              DDx:     780.57   G47.30  Sleep apnea              DDx:     823.02   S82.101S  Closed fractures of proximal tibia and fibula              DDx:         ORDERS:         Meds Prescribed:       Refill of: Atorvastatin Calcium 20mg Tablet 1 tab daily  #90 (Ninety) tablet(s) Refills: 1       Refill of: Flecainide Acetate 100mg Tablet Take 1 tablet(s) by mouth q12h  #180 (One [place] and Eighty) tablet(s) Refills: 1       Refill of: Metoprolol Succinate 100mg Tablets, Extended Release Take 1 tablet by mouth daily  #90 (Ninety) tablet(s) Refills: 1       Refill of: Duloxetine HCl 30mg Capsules, Delayed Release 1  capsule daily  #90 (Ninety) capsule(s) Refills: 1         Lab Orders:       [id number]  [facility] Comp. Metabolic Panel  (Send-Out)         03129  [facility] Lipid Panel  (Send-Out)         78377  [facility] TSH  (Send-Out)         APPTO  Appointment need  (In-House)           Procedures Ordered:       REFER  Referral to Specialist or Other Facility  (Send-Out)                   PLAN:          Paroxysmal atrial fibrillation         REFERRALS:  Referral initiated to a cardiologist ( Nathaniel ).      FOLLOW-UP: Schedule follow-up appointments on a p.r.n. basis. Schedule a follow-up visit in 6 months.      RECOMMENDATIONS:    - monitor symptoms    -  Continue alcohol avoidance.            Prescriptions:       Refill of: Atorvastatin Calcium 20mg Tablet 1 tab daily  #90 (Ninety) tablet(s) Refills: 1       Refill of: Flecainide Acetate 100mg Tablet Take 1 tablet(s) by mouth q12h  #180 (One [place] [address] [place]) tablet(s) Refills: 1       Refill of: Metoprolol Succinate 100mg Tablets, Extended Release Take 1 tablet by mouth daily  #90 (Ninety) tablet(s) Refills: 1           Orders:       REFER  Referral to Specialist or Other Facility  (Send-Out)         APPTO  Appointment need  (In-House)            Anxiety         RECOMMENDATIONS given include: avoidance of caffeine and stress reduction.            Prescriptions:       Refill of: Duloxetine HCl 30mg Capsules, Delayed Release 1 capsule daily  #90 (Ninety) capsule(s) Refills: 1          Screening for cardiovascular conditions     LABORATORY:  Labs ordered to be performed today include Comprehensive metabolic panel and lipid panel.      RECOMMENDATIONS given include: Further recommendation to be given after test results are complete.            Orders:       [id number]  [facility] Comp. Metabolic Panel  (Send-Out)         94256  [facility] Lipid Panel  (Send-Out)            Screening for thyroid disorder     LABORATORY:  Labs ordered to be performed today include TSH.       RECOMMENDATIONS given include: Further recommendation to be given after test results are complete.            Orders:       [id number]  TSH - [facility] TSH  (Send-Out)            Sleep apnea Continue use of CPAP. Discussed that sleep apnea may affect afib.          Closed fractures of proximal tibia and fibula Continue to follow with [facility].             Patient Recommendations:        For  Paroxysmal atrial fibrillation:         RECOMMENDATIONS:    - Monitor symptoms    - Schedule follow-up appointments as needed. Schedule a follow-up visit in 6 months.          For  Anxiety:     Try to avoid or reduce the amount of caffeine intake. Try stress reduction methods to reduce the frequency or lessen the severity of anxiety episodes.              CHARGE CAPTURE:           Primary Diagnosis:     427.31 Paroxysmal atrial fibrillation            I48.0    Paroxysmal atrial fibrillation              Orders:          90802   Office visit - new pt, level 3  (In-House)             APPTO   Appointment need  (In-House)           300.02 Anxiety            F41.9    Anxiety disorder, unspecified    V81.2 Screening for cardiovascular conditions            Z13.6    Encounter for screening for cardiovascular disorders    V77.0 Screening for thyroid disorder            Z13.29    Encounter for screening for other suspected endocrine disorder    780.57 Sleep apnea            G47.30    Sleep apnea, unspecified    823.02 Closed fractures of proximal tibia and fibula            S82.101S    Unspecified fracture of upper end of right tibia, sequela        ADDENDUMS:      ____________________________________    Date: 06/19/2018 09:26 AM    Author: Sadia Ocampo         Visit Note Faxed to:        User Entered Recipient; Number [phone]

## 2021-05-18 NOTE — PROGRESS NOTES
Logan Landa  1970     Office/Outpatient Visit    Visit Date: Wed, Apr 1, 2020 12:44 pm    Provider: Michell Villanueva N.P. (Assistant: Adelaide Harvey MA)    Location: [facility]        Electronically signed by Michell Villanueva N.P. on  04/01/2020 02:19:46 PM                             Subjective:        CC: Manuelito is a 50 year old White male.  This is a follow-up visit.  med refill         HPI: Logan is being seen via telemedicine this afternoon. His consent for this has been obtained.         History of parox. afib. On Flecainide and Metoprolol. Has seen Dr. Armstrong in the past. Wanted him to have stress test and Echo. History of cardiac ablation in 2016. He wants to see different cardiologist closer to home. No chest pain or palpitations. Reports that he was told no anticoagulation needed by cardiology as afib parox. Missed previous referral appointment due to heavy work schedule. Now working at a new job.         Anxiety details; current treatment includes Duloxetine. Has been on Duloxetine since 2016. Previously on Effexor XR and Paxil at one point. Had dosage increased with improvement. Doing well. Denies suicidal ideation.        History of sleep apnea. Compliant with CPAP. Has not followed up recently. Previously saw [facility] sleep specialist. Had previously been referred to [facility] but had to cancel appt. Needs referral to establish with new sleep specialist.         Had abnormal thyroid testing last year. Saw Dr Davis, endocrinology. Reports diagnosed with Hashimotos. No new medications have been started. He was advised to follow up in 6 months from that appointment but cancelled due to current coronavirus pandemic.          Previously taking Atorvastatin for hyperlipidemia. Has not had labs since 3/2019.        Previously missed colonoscopy consult appointment. Needs to reschedule.    ROS:     CONSTITUTIONAL:  Negative for chills and fever.      EYES:  Negative for blurred  vision and eye drainage.      E/N/T:  Negative for ear pain and sore throat.      CARDIOVASCULAR:  Negative for chest pain and palpitations.      RESPIRATORY:  Negative for dyspnea and frequent wheezing.      GASTROINTESTINAL:  Negative for abdominal pain and vomiting.      NEUROLOGICAL:  Negative for dizziness and headaches.      PSYCHIATRIC:  Positive for anxiety ( (stable on current medications) ).   Negative for suicidal thoughts.          Past Medical History / Family History / Social History:         Last Reviewed on 4/01/2020 01:16 PM by Michell Villanueva    Past Medical History:             PAST MEDICAL HISTORY         Atrial Fibrillation: cardiac ablation in 2016;     Sleep Apnea: uses CPAP;     Irritable Bowel Syndrome     renal failure after heat stroke, but no problems since     broken tib/fib, ankle surgeries     Anxiety     benign mole removal         CURRENT MEDICAL PROVIDERS:    Cardiologist: Dr. Armstrong    Orthopedist: Dr. CARPENTER         PREVENTIVE HEALTH MAINTENANCE             COLORECTAL CANCER SCREENING:; colonoscopy with normal results; [facility], 1995     PSA: was last done 3/20/19 with normal results         Surgical History:         sugery on crushed left pinky finger;     cardiac ablation for afib;         Family History:     Father: Cause of death was lung CA;  Arrhythmia ( afib )     Mother: melanoma         Social History:     Occupation: Former , [employer];     Marital Status:      Children: 3 children and custody of 4 grandchildren         Tobacco/Alcohol/Supplements:     Last Reviewed on 4/01/2020 12:49 PM by Adelaide Harvey    Tobacco: Current Smoker: He currently smokes every day, E-Cigarette.  Non-drinker         Substance Abuse History:     Last Reviewed on 12/17/2018 09:39 AM by Michell Villanueva    None         Mental Health History:     Last Reviewed on 12/17/2018 09:39 AM by Michell Villanueva        Anxiety         Communicable Diseases (eg STDs):     Last Reviewed on  12/17/2018 09:39 AM by Michell Villanueva    Reportable health conditions; NEGATIVE         Current Problems:     Last Reviewed on 12/17/2018 09:39 AM by Michell Villanueva    Anxiety disorder, unspecified    Sleep apnea, unspecified    Paroxysmal atrial fibrillation    Other abnormal glucose    Mixed hyperlipidemia    Obesity, unspecified    Autoimmune thyroiditis    Patient's noncompliance with other medical treatment and regimen        Immunizations:     Fluzone (3 + years dose) 11/1/2018        Allergies:     Last Reviewed on 4/01/2020 12:49 PM by Adelaide Harvey    Penicillins:      Clindamycin HCl:          Current Medications:     Last Reviewed on 4/01/2020 12:49 PM by Adelaide Harvey    Goody Powder - 1 PO QD     flecainide 100 mg oral tablet [Take 1 tablet(s) by mouth q12h]    metoprolol succinate 100 mg oral Tablet, Extended Release 24 hr [TAKE 1 TABLET BY MOUTH DAILY (PATIENT NEEDS APPOINTMENT)]    DULoxetine 60 mg oral capsule,delayed release (enteric coated) [TAKE 1 CAPSULE BY MOUTH EVERY DAY]        Objective:        Exams:     PHYSICAL EXAM:     NEUROLOGIC: mental status: alert; oriented to person, place, and time;     PSYCHIATRIC: normal speech pattern;         Assessment:         I48.0   Paroxysmal atrial fibrillation       F41.9   Anxiety disorder, unspecified       G47.30   Sleep apnea, unspecified       E78.2   Mixed hyperlipidemia       E06.3   Autoimmune thyroiditis       Z12.11   Encounter for screening for malignant neoplasm of colon       Z12.5   Encounter for screening for malignant neoplasm of prostate       Z91.19   Patient's noncompliance with other medical treatment and regimen       F17.200   Nicotine dependence, unspecified, uncomplicated           ORDERS:         Meds Prescribed:       [Refilled] flecainide 100 mg oral tablet [Take 1 tablet(s) by mouth q12h], #180 (one hundred and eighty) tablets, Refills: 0 (zero)       [Refilled] metoprolol succinate 100 mg oral Tablet, Extended Release 24 hr  [TAKE 1 TABLET BY MOUTH DAILY (PATIENT NEEDS APPOINTMENT)], #90 (ninety) tablets, Refills: 0 (zero)       [Refilled] DULoxetine 60 mg oral capsule,delayed release (enteric coated) [TAKE 1 CAPSULE BY MOUTH EVERY DAY], #90 (ninety) capsules, Refills: 0 (zero)       [Refilled] atorvastatin 20 mg oral tablet [1 tab daily], #90 (ninety) tablets, Refills: 0 (zero)         Lab Orders:       APPTO  Appointment need  (In-House)            37733  BDCBC - [facility] CBC with 3 part diff  (Send-Out)            94117  COMP - [facility] Comp. Metabolic Panel  (Send-Out)            19635  LPDP [facility] Lipid Panel  (Send-Out)            57705  FT4 - [facility] Thyroxine, free T4  (Send-Out)            46319  AMSA - [facility] Microsomal Antibodies  (Send-Out)            33644  TSH - [facility] TSH  (Send-Out)            *  PRSAS Medicare screening PSA  (Send-Out)            APPTO  Appointment need  (In-House)              Procedures Ordered:       REFER  Referral to Specialist or Other Facility  (Send-Out)            REFER  Referral to Specialist or Other Facility  (Send-Out)            REFER  Referral to Specialist or Other Facility  (Send-Out)              Other Orders:       4004F  Pt scrnd tobacco use rcvd tobacco cessation talk  (In-House)                      Plan:         Paroxysmal atrial fibrillationDiscussed importance of seeing cardiology with history of parox afib and cardiac ablation. ECG last in office 8/2019 SR. Will refill medications for now but needs to see cardiology and will not continue to prescribe on long term basis. Have not been able to get records from prior cardiology visits. Will request again.        REFERRALS:  Referral initiated to a cardiologist ( Dr. Franklyn Kenyon, [facility] ).  MIPS Vaccines Flu and Pneumonia updated in Shot record Telehealth: Verbal consent obtained for visit to occur via phone call; Staff, other than provider, present during telephone visit include Deonna Harvey MA; Total time spent  was 14 minutes; 12917--Nkieddebx E/M 11-20 minutes     FOLLOW-UP: Schedule a follow-up visit in 3 months.            Prescriptions:       [Refilled] flecainide 100 mg oral tablet [Take 1 tablet(s) by mouth q12h], #180 (one hundred and eighty) tablets, Refills: 0 (zero)       [Refilled] metoprolol succinate 100 mg oral Tablet, Extended Release 24 hr [TAKE 1 TABLET BY MOUTH DAILY (PATIENT NEEDS APPOINTMENT)], #90 (ninety) tablets, Refills: 0 (zero)           Orders:       REFER  Referral to Specialist or Other Facility  (Send-Out)            APPTO  Appointment need  (In-House)              Anxiety disorder, unspecifiedStable. Seek care immediately for worsening symptoms since as suicidal ideation.         FOLLOW-UP: Schedule a follow-up visit in 3 months.            Prescriptions:       [Refilled] DULoxetine 60 mg oral capsule,delayed release (enteric coated) [TAKE 1 CAPSULE BY MOUTH EVERY DAY], #90 (ninety) capsules, Refills: 0 (zero)           Orders:       APPTO  Appointment need  (In-House)              Sleep apnea, unspecifiedWill reschedule with sleep center. Reports that he is using CPAP.        REFERRALS:  Referral initiated to [facility].            Orders:       REFER  Referral to Specialist or Other Facility  (Send-Out)              Mixed hyperlipidemiaWill return for fasting labs in 1-2 months as coronavirus allows.     LABORATORY:  Labs ordered to be performed today include CBC, Comprehensive metabolic panel, and lipid panel.            Prescriptions:       [Refilled] atorvastatin 20 mg oral tablet [1 tab daily], #90 (ninety) tablets, Refills: 0 (zero)           Orders:       15673  [facility] - [facility] CBC with 3 part diff  (Send-Out)            06812  COMP - [facility] Comp. Metabolic Panel  (Send-Out)            00691  DP - [facility] Lipid Panel  (Send-Out)              Autoimmune thyroiditisAdvised patient to call endocrinology office to reschedule follow up. They may be doing telehealth visits during  coronavirus pandemic.     LABORATORY:  Labs ordered to be performed today include Free T4, Thyroid Other ANTI MICROSOMAL ANTIBODY, and TSH.            Orders:       [id number]  FT4 - [facility] Thyroxine, free T4  (Send-Out)            33807  AMSA - [facility] Microsomal Antibodies  (Send-Out)            68172  TSH - [facility] TSH  (Send-Out)              Encounter for screening for malignant neoplasm of colon        REFERRALS:  Referral initiated to a general surgeon ( Dr. Fredy Bess; a colonoscopy ).            Orders:       REFER  Referral to Specialist or Other Facility  (Send-Out)              Encounter for screening for malignant neoplasm of prostate    LABORATORY:  Labs ordered to be performed today include PSA.            Orders:       *  PRSAS Medicare screening PSA  (Send-Out)              Patient's noncompliance with other medical treatment and regimenDiscussed importance of compliance with scheduled follow ups, specialty appointments, and medications. Verbalized understanding. Aware that frequent missed appointments may get him dismissed.         Nicotine dependence, unspecified, uncomplicated        RECOMMENDATIONS given include: Counseled on smoking cessation and advised of the benefits to patient's health if he were to stop smoking. Counseling for less than 3 minutes.            Orders:       4004F  Pt scrnd tobacco use rcvd tobacco cessation talk  (In-House)                  Patient Recommendations:        For  Paroxysmal atrial fibrillation:    Schedule a follow-up visit in 3 months.          For  Anxiety disorder, unspecified:    Schedule a follow-up visit in 3 months.          For  Nicotine dependence, unspecified, uncomplicated:        Stop smoking.              Charge Capture:         Primary Diagnosis:     I48.0  Paroxysmal atrial fibrillation           Orders:      46857  Phys/QHP telephone evaluation 11-20 minutes  (In-House)            APPTO  Appointment need  (In-House)              F41.9  Anxiety  disorder, unspecified           Orders:      APPTO  Appointment need  (In-House)              G47.30  Sleep apnea, unspecified     E78.2  Mixed hyperlipidemia     E06.3  Autoimmune thyroiditis     Z12.11  Encounter for screening for malignant neoplasm of colon     Z12.5  Encounter for screening for malignant neoplasm of prostate     Z91.19  Patient's noncompliance with other medical treatment and regimen     F17.200  Nicotine dependence, unspecified, uncomplicated           Orders:      4004F  Pt scrnd tobacco use rcvd tobacco cessation talk  (In-House)

## 2021-05-18 NOTE — PROGRESS NOTES
"Logan Landa  1970     Office/Outpatient Visit    Visit Date: Fri, Nov 20, 2020 11:19 am    Provider: Keeley Carlin N.P. (Assistant: Sadia Melvin MA)    Location: [facility]        Electronically signed by Keeley Carlin N.P. on  11/20/2020 12:38:15 PM                             Subjective:        CC: Manuelito is a 50 year old White male.  Headache, sinus drainage, low grade fever, sore throat.          HPI: 51 y/o male presenting to office c/o sinus pressure, cough - clear sputum, fatigue, subjective fever, chills, headache, sore throat (has since subsided). SOA with moderate activity x 4 days. Someone at work with similar symptoms, Covid was negative. Wife started having similar symptoms 2 days later. They also had family friend over to their house almost 2 weeks ago that had been exposed to COVID. Pt has tried tyelnol/asa at home.    ROS:     CONSTITUTIONAL:  Negative for night sweats.      EYES:  Negative for blurred vision.      E/N/T:  Positive for ear pain ( right; \"pressure\" ), nasal congestion and frequent rhinorrhea.      CARDIOVASCULAR:  Negative for chest pain, dizziness, palpitations and pedal edema.      RESPIRATORY:  Negative for pleuritic chest pain and frequent wheezing.      GASTROINTESTINAL:  Negative for abdominal pain, diarrhea, nausea and vomiting.      MUSCULOSKELETAL:  Positive for myalgias.   Negative for arthralgias.      INTEGUMENTARY:  Negative for rash.      NEUROLOGICAL:  Positive for weakness ( generalized ).   Negative for paresthesias.          Past Medical History / Family History / Social History:         Last Reviewed on 11/20/2020 11:50 AM by Keeley Carlin    Past Medical History:             PAST MEDICAL HISTORY         Atrial Fibrillation: cardiac ablation in 2016;     Sleep Apnea: uses CPAP;     Irritable Bowel Syndrome     renal failure after heat stroke, but no problems since     broken tib/fib, ankle surgeries     Anxiety     benign mole " removal         CURRENT MEDICAL PROVIDERS:    Cardiologist: Dr Kenyon    Orthopedist: Dr. CARPENTER Endocrinologist         PREVENTIVE HEALTH MAINTENANCE             COLORECTAL CANCER SCREENING: Up to date (colonoscopy q10y; sigmoidoscopy q5y; Cologuard q3y) was last done 7/15/2020, Results are in chart; colonoscopy with normal results     PSA: was last done 5/20/20 with normal results         Surgical History:         sugery on crushed left pinky finger;     cardiac ablation for afib;         Family History:     Father: Cause of death was lung CA;  Arrhythmia ( afib )     Mother: melanoma         Social History:     Occupation: Former , [employer];     Marital Status:      Children: 3 children and custody of 4 grandchildren         Tobacco/Alcohol/Supplements:     Last Reviewed on 11/20/2020 11:50 AM by Keeley Carlin    Tobacco: Current Smoker: He currently smokes every day, E-Cigarette.  Non-drinker         Substance Abuse History:     Last Reviewed on 11/20/2020 11:50 AM by Keeley Carlin    None         Mental Health History:     Last Reviewed on 11/20/2020 11:50 AM by Keeley Carlin        Anxiety         Communicable Diseases (eg STDs):     Last Reviewed on 12/17/2018 09:39 AM by Michell Villanueva    Reportable health conditions; NEGATIVE         Immunizations:     influenza, injectable, quadrivalent, preservative free (FLUZONE QUAD 2739-6782) 9/24/2020    Fluzone (3 + years dose) 11/1/2018        Allergies:     Last Reviewed on 11/20/2020 11:50 AM by Keeley Carlin    Penicillins:      Clindamycin HCl:          Current Medications:     Last Reviewed on 11/20/2020 11:50 AM by Keeley Carlin    flecainide 100 mg oral tablet [Take 1 tablet(s) by mouth q12h]    atorvastatin 20 mg oral tablet [1 tab daily]    metoprolol succinate 100 mg oral Tablet, Extended Release 24 hr [TAKE 1 TABLET BY MOUTH DAILY]    DULoxetine 60 mg oral capsule,delayed release (enteric coated) [TAKE 1 CAPSULE BY MOUTH  EVERY DAY in addition to 20 mg capsule to equal total dose of 80 mg]    Goody Powder - 1 PO QD     aspirin 81 mg oral tablet,chewable [chew 1 tablet (81 mg) by oral route once daily]    DULoxetine 20 mg oral capsule,delayed release (enteric coated) [take 1 capsule (20 mg) by oral route once daily]        Objective:        Vitals:         Current: 11/20/2020 11:31:22 AM    Ht:  5 ft, 11 in;  Wt: 292 lbs;  BMI: 40.7T: 99.7 F (temporal);  BP: 125/80 mm Hg (right arm, sitting);  P: 72 bpm (right arm (BP Cuff), sitting);  sCr: 1.09 mg/dL;  GFR: 96.15        Exams:     PHYSICAL EXAM:     GENERAL: Vitals recorded well developed, well nourished;  well groomed;  no apparent distress;     EYES: extraocular movements intact; conjunctiva and cornea are normal; PERRLA;     E/N/T: EARS: the right TM is has fluid behind it;     NECK: range of motion is normal; thyroid exam reveals not enlarged;     RESPIRATORY: normal respiratory rate and pattern with no distress; normal breath sounds with no rales, rhonchi, wheezes or rubs;     CARDIOVASCULAR: normal rate; rhythm is regular;  no systolic murmur; no edema;     LYMPHATIC: no enlargement of cervical or facial nodes;     SKIN:  no rash; no jaundice, good turgor;     MUSCULOSKELETAL: normal gait; normal overall tone     NEUROLOGIC: mental status: alert and oriented x 3;     PSYCHIATRIC:  appropriate affect and demeanor; normal speech pattern; grossly normal memory;         Lab/Test Results:         Influenza A and B: Negative (11/20/2020),     Performed by:: atc (11/20/2020),             Assessment:         R50.9   Fever, unspecified           ORDERS:         Meds Prescribed:       [New Rx] cetirizine 10 mg oral tablet [take 1 tablet (10 mg) by oral route once daily], #30 (thirty) tablets, Refills: 5 (five)       [New Rx] fluticasone propionate 50 mcg/actuation Intranasal Spray, Suspension [inhale 1 spray (50 mcg) in each nostril by intranasal route daily], #16 (sixteen) milliliters,  Refills: 0 (zero)         Lab Orders:       [id number]  Infectious agent antigen detection by immunoassay; Influenza  (In-House)            77716  Infectious agent antigen detection by immunoassay; Influenza  (In-House)            71093  COVID 19 Testing [facility]  (Send-Out)                      Plan:         Fever, unspecifiedflu negative. Increase fluid intake and rest. Declines cough medication. Zyrtec and flonse daily. There were some mild exp wheezes in right upper lobe, but pt declined rescue inhaler at this point. Instructed pt to notify office if wheeze or soa worsens and we can try xopenex instead of albuterol as this has less adverse reactions (pt has hx of afib and is hesitant to use albuterol). he is to self quarantine and isolate.  Pt to go to ED with worsening soa or cp. Pt v/u and had no further questions upon d/c.          Prescriptions:       [New Rx] cetirizine 10 mg oral tablet [take 1 tablet (10 mg) by oral route once daily], #30 (thirty) tablets, Refills: 5 (five)       [New Rx] fluticasone propionate 50 mcg/actuation Intranasal Spray, Suspension [inhale 1 spray (50 mcg) in each nostril by intranasal route daily], #16 (sixteen) milliliters, Refills: 0 (zero)           Orders:       [id number]  Infectious agent antigen detection by immunoassay; Influenza  (In-House)            70939  Infectious agent antigen detection by immunoassay; Influenza  (In-House)            70407  COVID 19 Testing [facility]  (Send-Out)                  Charge Capture:         Primary Diagnosis:     R50.9  Fever, unspecified           Orders:      76486  Office/outpatient visit; established patient, level 3  (In-House)            98496-08  Infectious agent antigen detection by immunoassay; Influenza  (In-House)            89281  Infectious agent antigen detection by immunoassay; Influenza  (In-House)

## 2021-05-18 NOTE — PROGRESS NOTES
Logan LandaNicolas 1970     Office/Outpatient Visit    Visit Date: Thu, Aug 8, 2019 10:20 am    Provider: Michell Villanueva N.P. (Assistant: Kathrine Edwards MA)    Location: [facility]        Electronically signed by Michell Villanueva N.P. on  08/08/2019 11:47:15 AM                             SUBJECTIVE:        CC:     Manuelito is a 49 year old White male.  Patient presents today for follow up visit         HPI:     History of parox. afib. On Flecainide and Metoprolol. Has seen Dr. Armstrong in the past. Wanted him to have stress test and Echo. History of cardiac ablation in 2016. He wants to see different cardiologist closer to home. No chest pain or palpitations. Reports that he was told no anticoagulation needed by cardiology as afib parox. Missed previous referral appointment.         Anxiety details; current treatment includes Duloxetine. Has been on Duloxetine since 2016. Previously on Effexor XR and Paxil at one point. Duloxetine dose was increased about 3 months ago. Improvement in symptoms. Still having trouble sleeping. Trouble falling asleep and wakes up during night.         History of sleep apnea. Compliant with CPAP. Has not followed up recently. Previously saw [facility] specialist. Had previously been referred to [facility] sleep center but had to cancel appt. Needs referral.         Abnormal thyroid testing. Has seen endocrinology. Reports diagnosed with Hashimotos. Advised to follow up in 6 months. No new medications started.          On Atorvastatin for hyperlipidemia. Due for labs.         Previously missed colonoscopy consult appointment. Needs to reschedule.                         ROS:     CONSTITUTIONAL:  Positive for fatigue.   Negative for fever.      EYES:  Negative for blurred vision and eye drainage.      CARDIOVASCULAR:  Negative for chest pain and palpitations.      RESPIRATORY:  Negative for dyspnea and frequent wheezing.      GASTROINTESTINAL:  Negative for abdominal pain and  vomiting.      NEUROLOGICAL:  Negative for dizziness and headaches.      PSYCHIATRIC:  Positive for anxiety.   Negative for suicidal thoughts.          PMH/FMH/SH:     Last Reviewed on 8/08/2019 10:24 AM by Michell Villanueva    Past Medical History:             PAST MEDICAL HISTORY         Atrial Fibrillation: cardiac ablation in 2016;     Sleep Apnea: uses CPAP;     Irritable Bowel Syndrome     renal failure after heat stroke, but no problems since     broken tib/fib, ankle surgeries     Anxiety     benign mole removal         CURRENT MEDICAL PROVIDERS:    Cardiologist: Dr. Armstrong    Orthopedist: Dr. CARPENTER         PREVENTIVE HEALTH MAINTENANCE             COLORECTAL CANCER SCREENING:; colonoscopy with normal results; [facility], 1995     PSA: was last done 3/20/19 with normal results         Surgical History:         sugery on crushed left pinky finger;      cardiac ablation for afib;         Family History:     Father: Cause of death was lung CA;  Arrhythmia ( afib )     Mother: melanoma         Social History:     Occupation: Former , Electric;     Marital Status:      Children: 3 children and custody of 4 grandchildren         Tobacco/Alcohol/Supplements:     Last Reviewed on 8/08/2019 10:22 AM by Kathrine Edwards    Tobacco: Current Smoker: He currently smokes every day, E-Cigarette.  Non-drinker         Substance Abuse History:     Last Reviewed on 12/17/2018 09:39 AM by Michell Villanueva    None         Mental Health History:     Last Reviewed on 12/17/2018 09:39 AM by Michell Villanueva        Anxiety         Communicable Diseases (eg STDs):     Last Reviewed on 12/17/2018 09:39 AM by Michell Villanueva    Reportable health conditions; NEGATIVE             Current Problems:     Last Reviewed on 12/17/2018 09:39 AM by Michell Villanueva    Obesity, unspecified     Hyperlipidemia     Elevated fasting glucose     Sleep apnea     Anxiety     Paroxysmal atrial fibrillation     Screening for depression          Immunizations:     Fluzone (3 + years dose) 11/1/2018         Allergies:     Last Reviewed on 8/08/2019 10:21 AM by Kathrine Edwards    Penicillins:    Clindamycin HCl:        Current Medications:     Last Reviewed on 8/08/2019 10:21 AM by Kathrine Edwards    Flecainide Acetate 100mg Tablet Take 1 tablet(s) by mouth q12h     Duloxetine HCl 60mg Capsules, Delayed Release Take 1 capsule(s) by mouth daily     Metoprolol Succinate 100mg Tablets, Extended Release Take 1 tablet by mouth daily     Goody Powder - 1 PO QD         OBJECTIVE:        Vitals:         Current: 8/8/2019 10:23:28 AM    Ht:  5 ft, 11 in;  Wt: 279.8 lbs;  BMI: 39.0    T: 98.3 F (oral);  BP: 130/82 mm Hg (left arm, sitting);  P: 78 bpm (left arm (BP Cuff), sitting);  sCr: 1 mg/dL;  GFR: 104.04        Exams:     PHYSICAL EXAM:     GENERAL: well developed, well nourished;  no apparent distress;     RESPIRATORY: normal respiratory rate and pattern with no distress; normal breath sounds with no rales, rhonchi, wheezes or rubs;     CARDIOVASCULAR: normal rate; rhythm is regular;     MUSCULOSKELETAL: normal gait;     NEUROLOGIC: mental status: alert and oriented x 3; GROSSLY INTACT     PSYCHIATRIC: appropriate affect and demeanor; normal psychomotor function; normal speech pattern; normal thought and perception;         Lab/Test Results:         LABORATORY RESULTS: EKG performed by pr         ASSESSMENT           427.31   I48.0  Paroxysmal atrial fibrillation              DDx:     300.02   F41.9  Anxiety              DDx:     272.4   E78.2  Hyperlipidemia              DDx:     780.57   G47.30  Sleep apnea              DDx:     V76.49   Z12.11  Screening for colon cancer              DDx:     V15.81   Z91.19  History of noncompliance with medical treatment              DDx:     V79.0   Z13.89  Screening for depression              DDx:     245.2   E06.3  Hashimoto's disease              DDx:         ORDERS:         Meds Prescribed:       Refill of: Metoprolol  Succinate 100mg Tablets, Extended Release Take 1 tablet by mouth daily  #90 (Ninety) tablet(s) Refills: 0       Refill of: Flecainide Acetate 100mg Tablet Take 1 tablet(s) by mouth q12h  #180 (One [place] and Eighty) tablet(s) Refills: 0       Refill of: Duloxetine HCl 60mg Capsules, Delayed Release Take 1 capsule(s) by mouth daily  #90 (Ninety) capsule(s) Refills: 0       Trazodone HCl 50mg Tablet Take 1 - 2  tablet(s) by mouth at bedtime PRN  #60 (Sixty) tablet(s) Refills: 0       Refill of: Atorvastatin Calcium 20mg Tablet 1 tab daily  #90 (Ninety) tablet(s) Refills: 0         Radiology/Test Orders:       53116  Electrocardiogram, routine with at least 12 leads; with interpretation and report  (In-House)           Lab Orders:       [id number]  [facility] Comp. Metabolic Panel  (Send-Out)         10506  [facility] Lipid Panel  (Send-Out)         APPTO  Appointment need  (In-House)           Procedures Ordered:       REFER  Referral to Specialist or Other Facility  (Send-Out)         REFER  Referral to Specialist or Other Facility  (Send-Out)         REFER  Referral to Specialist or Other Facility  (Send-Out)           Other Orders:         Depression screen positive and follow up plan documented  (In-House)           Negative EtOH screen  (In-House)                   PLAN:          Paroxysmal atrial fibrillation Will refill medications until seen by cardiology. ECG SR without acute ST or T wave abnormalities.         TESTS/PROCEDURES:  Will proceed with an ECG to be performed/scheduled now.      REFERRALS:  Referral initiated to a cardiologist ( Dr. Franklyn Kenyon, [facility] ).            Prescriptions:       Refill of: Metoprolol Succinate 100mg Tablets, Extended Release Take 1 tablet by mouth daily  #90 (Ninety) tablet(s) Refills: 0       Refill of: Flecainide Acetate 100mg Tablet Take 1 tablet(s) by mouth q12h  #180 (One [place] and Eighty) tablet(s) Refills: 0           Orders:        [id number]  Electrocardiogram, routine with at least 12 leads; with interpretation and report  (In-House)         REFER  Referral to Specialist or Other Facility  (Send-Out)            Anxiety Will add Trazodone to help with sleep.         FOLLOW-UP: Schedule a follow-up visit in 3 months.            Prescriptions:       Refill of: Duloxetine HCl 60mg Capsules, Delayed Release Take 1 capsule(s) by mouth daily  #90 (Ninety) capsule(s) Refills: 0       Trazodone HCl 50mg Tablet Take 1 - 2  tablet(s) by mouth at bedtime PRN  #60 (Sixty) tablet(s) Refills: 0           Orders:       APPTO  Appointment need  (In-House)            Hyperlipidemia Checking labs.     LABORATORY:  Labs ordered to be performed today include Comprehensive metabolic panel and lipid panel.            Prescriptions:       Refill of: Atorvastatin Calcium 20mg Tablet 1 tab daily  #90 (Ninety) tablet(s) Refills: 0           Orders:       [id number]  [facility] Comp. Metabolic Panel  (Send-Out)         33822  [facility] Lipid Panel  (Send-Out)            Sleep apnea         REFERRALS:  Referral initiated to [facility].            Orders:       REFER  Referral to Specialist or Other Facility  (Send-Out)            Screening for colon cancer         REFERRALS:  Referral initiated to a general surgeon ( Dr. Fredy Bess; a colonoscopy ).            Orders:       REFER  Referral to Specialist or Other Facility  (Send-Out)            History of noncompliance with medical treatment Discussed importance of keeping scheduled specialist appointments.          Screening for depression     MIPS Positive Depression Screen: Suicide Risk Assessment completed--denies suicidal/homicidal ideation; Pharmacologic intervention initiated/modified Negative alcohol screen           Orders:         Depression screen positive and follow up plan documented  (In-House)           Negative EtOH screen  (In-House)            Hashimoto's disease Discussed Hashimoto.  Recommend continued follow up with endocrinology as per their recommendations.             Patient Recommendations:        For  Anxiety:     Schedule a follow-up visit in 3 months.              CHARGE CAPTURE           **Please note: ICD descriptions below are intended for billing purposes only and may not represent clinical diagnoses**        Primary Diagnosis:         427.31 Paroxysmal atrial fibrillation            I48.0    Paroxysmal atrial fibrillation              Orders:          85340   Office/outpatient visit; established patient, level 4  (In-House)             44842   Electrocardiogram, routine with at least 12 leads; with interpretation and report  (In-House)           300.02 Anxiety            F41.9    Anxiety disorder, unspecified              Orders:          APPTO   Appointment need  (In-House)           272.4 Hyperlipidemia            E78.2    Mixed hyperlipidemia    780.57 Sleep apnea            G47.30    Sleep apnea, unspecified    V76.49 Screening for colon cancer            Z12.11    Encounter for screening for malignant neoplasm of colon    V15.81 History of noncompliance with medical treatment            Z91.19    Patient's noncompliance with other medical treatment and regimen    V79.0 Screening for depression            Z13.89    Encounter for screening for other disorder              Orders:             Depression screen positive and follow up plan documented  (In-House)                Negative EtOH screen  (In-House)           245.2 Hashimoto's disease            E06.3    Autoimmune thyroiditis

## 2021-05-18 NOTE — PROGRESS NOTES
Logan Landa 1970     Office/Outpatient Visit    Visit Date: Mon, Dec 17, 2018 08:29 am    Provider: Michell Villanueva N.P. (Assistant: Mulu Stephens RN)    Location: [facility]        Electronically signed by Michell Villanueva N.P. on  12/17/2018 09:52:02 AM                             SUBJECTIVE:        CC:     Manuelito is a 48 year old White male.  6 month check up         HPI: Logan reports that on previous labs with former PCP, he has had low HDL cholesterol. Also with elevated blood sugars in the past and was advised that he may have to start prediabetes medications if no improvement. Did not have labs drawn that were ordered 6 months ago.         History of parox. afib. On Flecainide and Metoprolol. Has seen Dr. Armstrong. Wants him to have stress test and Echo. Will have to call back to schedule. History of cardiac ablation in 2016.         Anxiety details; current treatment includes Duloxetine.  Has had increased feelings of depression recently. Sitting around not doing much. Feels like he needs to have medication adjusted. Has started new job in [place].     History of sleep apnea. Compliant with CPAP. Has not followed up recently. Previously saw [facility] sleep specialist.         Manuelito is here for routine periodic health screening and examination.  He underwent colonoscopy in 1995 with normal results.  He is not yet 49 years old. He performs testicular self-exams occasionally.  He is current with his influenza immunization.  Thinks that he is also up to date on his tetanus vaccine. In regard to the health checkup,         PHQ-9 Depression Screening: Completed form scanned and in chart; Total Score 7 Alcohol Consumption Screening: Completed form scanned and in chart; Total Score 0     ROS:     CONSTITUTIONAL:  Negative for chills and fever.      EYES:  Negative for blurred vision and eye drainage.      E/N/T:  Negative for ear pain and sore throat.      CARDIOVASCULAR:  Negative for chest pain  and palpitations.      RESPIRATORY:  Negative for dyspnea and frequent wheezing.      GASTROINTESTINAL:  Negative for abdominal pain and vomiting.      GENITOURINARY:  Negative for dysuria and polyuria.      MUSCULOSKELETAL:  Positive for fractured tibia/fibula left. Has been seeing ortho. Cleared from cast. Has improved..      INTEGUMENTARY:  Negative for rash.      NEUROLOGICAL:  Negative for dizziness and headaches.      HEMATOLOGIC/LYMPHATIC:  Negative for easy bruising and lymphadenopathy.      ENDOCRINE:  Negative for polydipsia and polyphagia.      PSYCHIATRIC:  Positive for depression.   Negative for suicidal thoughts.          PMH/FMH/SH:     Last Reviewed on 6/18/2018 04:59 PM by Michell Villanueva    Past Medical History:             PAST MEDICAL HISTORY         Atrial Fibrillation: cardiac ablation in 2016;     Sleep Apnea: uses CPAP;     Irritable Bowel Syndrome     renal failure after heat stroke, but no problems since     broken tib/fib, ankle surgeries     Anxiety     benign mole removal         CURRENT MEDICAL PROVIDERS:    Orthopedist: Dr. CARPENTER         PREVENTIVE HEALTH MAINTENANCE             COLORECTAL CANCER SCREENING:; colonoscopy with normal results; [facility]         Surgical History:         sugery on crushed left pinky finger;      cardiac ablation for afib;         Family History:     Father: Cause of death was lung CA;  Arrhythmia ( afib )     Mother: melanoma         Social History:     Occupation: Former ,  [employer];     Marital Status:      Children: 3 children and custody of 4 grandchildren         Tobacco/Alcohol/Supplements:     Last Reviewed on 6/18/2018 04:59 PM by Michell Villanueva    Tobacco: Current Smoker: He currently smokes every day, E-Cigarette.  Non-drinker         Substance Abuse History:     Last Reviewed on 6/18/2018 04:59 PM by Michell Villanueva    None         Mental Health History:     Last Reviewed on 6/18/2018 04:59 PM by Michell Villanueva        Anxiety          Communicable Diseases (eg STDs):     Last Reviewed on 6/18/2018 04:59 PM by Michell Villanueva    Reportable health conditions; NEGATIVE             Current Problems:     Last Reviewed on 6/18/2018 04:59 PM by Michell Villanueva    Sleep apnea     Anxiety     Paroxysmal atrial fibrillation         Immunizations:     None        Allergies:     Last Reviewed on 12/17/2018 08:32 AM by Mulu Stephens    Penicillins:    Clindamycin HCl:        Current Medications:     Last Reviewed on 12/17/2018 08:32 AM by Mulu Stephens    Duloxetine HCl 30mg Capsules, Delayed Release 1 capsule daily     Flecainide Acetate 100mg Tablet Take 1 tablet(s) by mouth q12h     Atorvastatin Calcium 20mg Tablet 1 tab daily     Metoprolol Succinate 100mg Tablets, Extended Release Take 1 tablet by mouth daily     Goody Powder - 1 PO QD         OBJECTIVE:        Vitals:         Historical:     06/18/2018  BP:   105/71 mm Hg ( (left arm, , sitting, );)     06/18/2018  Wt:   280.8lbs        Current: 12/17/2018 8:31:49 AM    Ht:  5 ft, 11 in;  Wt: 277 lbs;  WC: 51 inches;  BMI: 38.6    T: 98.4 F (oral);  BP: 132/81 mm Hg (left arm, sitting);  P: 61 bpm (left arm (BP Cuff), sitting)        Exams:     PHYSICAL EXAM:     GENERAL: well developed, well nourished;  no apparent distress;     EYES: PERRL, EOMI     E/N/T: EARS: external auditory canal normal;  bilateral TMs are normal;  NOSE: normal turbinates; no sinus tenderness; OROPHARYNX: oral mucosa is normal; posterior pharynx shows no exudate;     NECK: range of motion is normal; trachea is midline;     RESPIRATORY: normal respiratory rate and pattern with no distress; normal breath sounds with no rales, rhonchi, wheezes or rubs;     CARDIOVASCULAR: normal rate; rhythm is regular;     GASTROINTESTINAL: nontender; normal bowel sounds; no organomegaly;     SKIN:  no significant rashes or lesions; no suspicious moles;     MUSCULOSKELETAL: normal gait; normal range of motion of all major muscle groups; no limb  or joint pain with range of motion;     NEUROLOGIC: mental status: alert and oriented x 3; GROSSLY INTACT     PSYCHIATRIC: appropriate affect and demeanor; normal psychomotor function; normal speech pattern; normal thought and perception;         ASSESSMENT           V70.0   Z00.00  Health checkup              DDx:     427.31   I48.0  Paroxysmal atrial fibrillation              DDx:     300.02   F41.9  Anxiety              DDx:     780.57   G47.30  Sleep apnea              DDx:     790.21   R73.09  Elevated fasting glucose              DDx:     V79.0   Z13.89  Screening for depression              DDx:     272.4   E78.2  Hyperlipidemia              DDx:         ORDERS:         Meds Prescribed:       Refill of: Flecainide Acetate 100mg Tablet Take 1 tablet(s) by mouth q12h  #180 (One [place] [address]) tablet(s) Refills: 0       Refill of: Metoprolol Succinate 100mg Tablets, Extended Release Take 1 tablet by mouth daily  #90 (Ninety) tablet(s) Refills: 0       Refill of: Duloxetine HCl 40mg Capsules, Delayed Release Take 1 capsule(s) by mouth daily  #90 (Ninety) capsule(s) Refills: 0       Refill of: Atorvastatin Calcium 20mg Tablet 1 tab daily  #90 (Ninety) tablet(s) Refills: 0         Lab Orders:       [id number]  [facility] PHYSICAL: CMP, CBC, TSH, LIPID: [id number], [id number], [id number], [id number]  (Send-Out)         [id number]  A1C - [facility] Hemoglobin A1C  (Send-Out)         APPTO  Appointment need  (In-House)           Procedures Ordered:       REFER  Referral to Specialist or Other Facility  (Send-Out)           Other Orders:         Calculated BMI above the upper parameter and a follow-up plan was documented in the medical record  (In-House)           Depression screen positive and follow up plan documented  (In-House)           Negative EtOH screen  (In-House)                   PLAN:          Health checkup     LABORATORY:  Labs ordered to be performed today include PHYSICAL PANEL; CMP, CBC, TSH, LIPID.  MIPS Vaccines  Flu and Pneumonia updated in Shot record     BMI Elevated - Follow-Up Plan: He was provided education on weight loss strategies     COUNSELING was provided today regarding the following topics: healthy eating habits, regular exercise, and testicular self-exam.            Orders:       [id number]  [facility] PHYSICAL: CMP, CBC, TSH, LIPID: [id number], [id number], [id number], [id number]  (Send-Out)           Calculated BMI above the upper parameter and a follow-up plan was documented in the medical record  (In-House)            Paroxysmal atrial fibrillation Follow up with cardiology per their recommendations. He will call to schedule follow up testing as advised per Dr. Lao. Denies palpitations.           Prescriptions:       Refill of: Flecainide Acetate 100mg Tablet Take 1 tablet(s) by mouth q12h  #180 (One [place] and [address]y) tablet(s) Refills: 0       Refill of: Metoprolol Succinate 100mg Tablets, Extended Release Take 1 tablet by mouth daily  #90 (Ninety) tablet(s) Refills: 0          Anxiety Will increase Duloxetine to 40 mg daily. Seek care immediately for worsening symptoms such as suicidal ideation.         FOLLOW-UP: Schedule follow-up appointments on a p.r.n. basis. Schedule a follow-up visit in 3 months.            Prescriptions:       Refill of: Duloxetine HCl 40mg Capsules, Delayed Release Take 1 capsule(s) by mouth daily  #90 (Ninety) capsule(s) Refills: 0           Orders:       APPTO  Appointment need  (In-House)            Sleep apnea Continue CPAP use.         REFERRALS:  Referral initiated to [facility].            Orders:       REFER  Referral to Specialist or Other Facility  (Send-Out)            Elevated fasting glucose     LABORATORY:  Labs ordered to be performed today include HgbA1C.      RECOMMENDATIONS given include: Further recommendation to be given after test results are complete.            Orders:       [id number]  ([facility])            Screening for depression      [facility] PHQ-9 Depression Screening Completed form scanned and in chart; Total Score 7 Positive Depression Screen: Suicide Risk Assessment completed--denies suicidal/homicidal ideation; Pharmacologic intervention initiated/modified Negative alcohol screen           Orders:         Depression screen positive and follow up plan documented  (In-House)           Negative EtOH screen  (In-House)            Hyperlipidemia Continue Atorvastatin. Low cholesterol diet. Regular aerobic exercise. Checking labs today.           Prescriptions:       Refill of: Atorvastatin Calcium 20mg Tablet 1 tab daily  #90 (Ninety) tablet(s) Refills: 0             Patient Recommendations:        For  Health checkup:     Limit dietary intake of fat (especially saturated fat) and cholesterol.  Eat a variety of foods, including plenty of fruits, vegetables, and grain containg fiber, limit fat intake to 30% of total calories. Balance caloric intake with energy expended. Maintaining regular physical activity is advised to help prevent heart disease, hypertension, diabetes, and obesity.    Testicular cancer is the #1 cancer in men ages 15-35.  You should regularly examine your testicles for knots, lumps, or tenderness. Testicular pain, even if not associated with any masses, needs to be evaluated by your doctor!          For  Anxiety:     Schedule follow-up appointments as needed. Schedule a follow-up visit in 3 months.              CHARGE CAPTURE           **Please note: ICD descriptions below are intended for billing purposes only and may not represent clinical diagnoses**        Primary Diagnosis:         V70.0 Health checkup            Z00.00    Encounter for general adult medical examination without abnormal findings              Orders:          [id number]   Preventive medicine, established patient, age 40-64 years  (In-House)                Calculated BMI above the upper parameter and a follow-up plan was documented in the medical  record  (In-House)           427.31 Paroxysmal atrial fibrillation            I48.0    Paroxysmal atrial fibrillation              Orders:          [id number] -25  Office/outpatient visit; established patient, level 4  (In-House)           300.02 Anxiety            F41.9    Anxiety disorder, unspecified              Orders:          APPTO   Appointment need  (In-House)           780.57 Sleep apnea            G47.30    Sleep apnea, unspecified    790.21 Elevated fasting glucose            R73.09    Other abnormal glucose    V79.0 Screening for depression            Z13.89    Encounter for screening for other disorder              Orders:             Depression screen positive and follow up plan documented  (In-House)                Negative EtOH screen  (In-House)           272.4 Hyperlipidemia            E78.2    Mixed hyperlipidemia        ADDENDUMS:      ____________________________________    Addendum: 12/17/2018 03:54 PM - Jamila Monaco         Visit Note Faxed to:        [facility] ; Number [phone]

## 2021-05-18 NOTE — PROGRESS NOTES
Logan Landa  1970     Office/Outpatient Visit    Visit Date: u, Apr 22, 2021 04:34 pm    Provider: Michell Villanueva N.P. (Assistant: Lexis Macias MA)    Location: [facility]        Electronically signed by Michell Villanueva N.P. on  06/03/2021 08:01:55 AM                             Subjective:        CC: Manuelito is a 51 year old White male.  This is a follow-up visit.  Medication refill for BP         HPI: History of parox. afib. On Flecainide and Metoprolol. History of cardiac ablation in 2016. Saw Dr Kenyon 5/2020 who did not make any changes to treatment.         Anxiety details; current treatment includes Duloxetine. Has been on Duloxetine since 2016. Previously on Effexor XR and Paxil at one point.  Denies suicidal ideation.        History of sleep apnea. Compliant with CPAP. Previously saw [facility] sleep specialist. Has not been seen by sleep specialist for some time. Was referred but did not keep appt.         On Atorvastatin for hyperlipidemia. Normal lipid panel 5/20/2020.        Additionally, Manuelito c/o lump on right side of neck. Neck pain. Has had xray, MRI in past. Been to PT, pain management without improvement.    ROS:     CONSTITUTIONAL:  Negative for chills and fever.      CARDIOVASCULAR:  Negative for chest pain and palpitations.      RESPIRATORY:  Negative for dyspnea and frequent wheezing.      GASTROINTESTINAL:  Negative for abdominal pain and vomiting.      MUSCULOSKELETAL:  Positive for neck pain.      NEUROLOGICAL:  Negative for dizziness and headaches.      PSYCHIATRIC:  Negative for depression and suicidal thoughts.          Past Medical History / Family History / Social History:         Last Reviewed on 11/20/2020 11:50 AM by Keeley Carlin    Past Medical History:             PAST MEDICAL HISTORY         Atrial Fibrillation: cardiac ablation in 2016;     Sleep Apnea: uses CPAP;     Irritable Bowel Syndrome     renal failure after heat stroke, but no problems since      broken tib/fib, ankle surgeries     Anxiety     benign mole removal         CURRENT MEDICAL PROVIDERS:    Cardiologist: Dr Kenyon    Orthopedist: Dr. CARPENTER Endocrinologist         PREVENTIVE HEALTH MAINTENANCE             COLORECTAL CANCER SCREENING: Up to date (colonoscopy q10y; sigmoidoscopy q5y; Cologuard q3y) was last done 7/15/2020, Results are in chart; colonoscopy with normal results     PSA: was last done 5/18/21 with normal results         Surgical History:         sugery on crushed left pinky finger;     cardiac ablation for afib;         Family History:     Father: Cause of death was lung CA;  Arrhythmia ( afib )     Mother: melanoma         Social History:     Occupation: Former , Electric;     Marital Status:      Children: 3 children and custody of 4 grandchildren         Tobacco/Alcohol/Supplements:     Last Reviewed on 4/22/2021 04:37 PM by Lexis Macias    Tobacco: Current Smoker: He currently smokes every day, E-Cigarette.  Non-drinker         Substance Abuse History:     Last Reviewed on 11/20/2020 11:50 AM by Keeley Carlin    None         Mental Health History:     Last Reviewed on 11/20/2020 11:50 AM by Keeley Carlin        Anxiety         Communicable Diseases (eg STDs):     Last Reviewed on 12/17/2018 09:39 AM by Michell Villanueva    Reportable health conditions; NEGATIVE         Current Problems:     Last Reviewed on 11/20/2020 11:50 AM by Keeley Carlin    Dysthymic disorder    Sleep apnea, unspecified    Paroxysmal atrial fibrillation    Mixed hyperlipidemia    Other abnormal glucose    Obesity, unspecified    Autoimmune thyroiditis    Patient's noncompliance with other medical treatment and regimen    Encounter for screening for malignant neoplasm of colon    Encounter for screening for malignant neoplasm of prostate    Nicotine dependence, unspecified, uncomplicated    Other specified noninfective gastroenteritis and colitis    Encounter for screening for  depression    Encounter for immunization    Fever, unspecified    Cervicalgia    Localized swelling, mass and lump, neck    Encounter for screening for cardiovascular disorders    Allergic rhinitis, unspecified        Immunizations:     influenza, injectable, quadrivalent, preservative free (FLUZONE QUAD [id number]) 9/24/2020    Fluzone (3 + years dose) 11/1/2018        Allergies:     Last Reviewed on 4/22/2021 04:36 PM by Lexis Macias    Penicillins:      Clindamycin HCl:          Current Medications:     Last Reviewed on 4/22/2021 04:37 PM by Lexis Macias    Goody Powder - 1 PO QD     atorvastatin 20 mg oral tablet [TAKE 1 TABLET BY MOUTH DAILY]    DULoxetine 60 mg oral capsule,delayed release (enteric coated) [TAKE 1 CAPSULE BY MOUTH EVERY DAY IN ADDITION TO 20 MG CAPSULE TO EQUAL TOTAL DOSE OF 80 MG]    flecainide 100 mg oral tablet [TAKE 1 TABLET BY MOUTH EVERY 12 HOURS]    metoprolol succinate 100 mg oral Tablet, Extended Release 24 hr [TAKE 1 TABLET BY MOUTH DAILY]    aspirin 81 mg oral tablet,chewable [chew 1 tablet (81 mg) by oral route once daily]    DULoxetine 20 mg oral capsule,delayed release (enteric coated) [TAKE 1 CAPSULE(20 MG) BY MOUTH EVERY DAY]    cetirizine 10 mg oral tablet [take 1 tablet (10 mg) by oral route once daily]    fluticasone propionate 50 mcg/actuation Intranasal Spray, Suspension [SHAKE LIQUID AND USE 1 SPRAY(50 MCG) IN EACH NOSTRIL DAILY]        Objective:        Vitals:         Historical:     11/20/2020  BP:   125/80 mm Hg ( (right arm, , sitting, );) 11/20/2020  P:   72bpm ( (right arm (BP Cuff), , sitting, );) 11/20/2020  Wt:   292lbs    Current: 4/22/2021 4:38:55 PM    Ht:  5 ft, 11 in;  Wt: 298.8 lbs;  BMI: 41.7T: 96.9 F (temporal);  BP: 150/93 mm Hg (left arm, sitting);  P: 67 bpm (left arm (BP Cuff), sitting);  sCr: 1.09 mg/dL;  GFR: 96.03        Exams:     PHYSICAL EXAM:     GENERAL: well developed, well nourished;  no apparent distress;     NECK: range of  motion is normal; trachea is midline; area of palpable swelling noted on right side of neck;     RESPIRATORY: normal respiratory rate and pattern with no distress; normal breath sounds with no rales, rhonchi, wheezes or rubs;     CARDIOVASCULAR: normal rate; rhythm is regular;     NEUROLOGIC: mental status: alert and oriented x 3; GROSSLY INTACT     PSYCHIATRIC: appropriate affect and demeanor;         Assessment:         M54.2   Cervicalgia       F34.1   Dysthymic disorder       I48.0   Paroxysmal atrial fibrillation       Z13.6   Encounter for screening for cardiovascular disorders       Z12.5   Encounter for screening for malignant neoplasm of prostate       E78.2   Mixed hyperlipidemia       Z91.19   Patient's noncompliance with other medical treatment and regimen       J30.9   Allergic rhinitis, unspecified       R22.1   Localized swelling, mass and lump, neck           ORDERS:         Meds Prescribed:       [Refilled] DULoxetine 60 mg oral capsule,delayed release (enteric coated) [TAKE 1 CAPSULE BY MOUTH EVERY DAY IN ADDITION TO 20 MG CAPSULE TO EQUAL TOTAL DOSE OF 80 MG], #90 (ninety) capsules, Refills: 1 (one)       [Refilled] DULoxetine 20 mg oral capsule,delayed release (enteric coated) [TAKE 1 CAPSULE(20 MG) BY MOUTH EVERY DAY], #90 (ninety) capsules, Refills: 1 (one)       [Refilled] metoprolol succinate 100 mg oral Tablet, Extended Release 24 hr [TAKE 1 TABLET BY MOUTH DAILY], #90 (ninety) tablets, Refills: 1 (one)       [Refilled] atorvastatin 20 mg oral tablet [TAKE 1 TABLET BY MOUTH DAILY], #90 (ninety) tablets, Refills: 1 (one)       [Refilled] aspirin 81 mg oral tablet,chewable [chew 1 tablet (81 mg) by oral route once daily], #90 (ninety) tablets, Refills: 1 (one)       [Refilled] cetirizine 10 mg oral tablet [take 1 tablet (10 mg) by oral route once daily], #90 (ninety) tablets, Refills: 1 (one)       [Refilled] fluticasone propionate 50 mcg/actuation Intranasal Spray, Suspension [SHAKE LIQUID  AND USE 1 SPRAY(50 MCG) IN EACH NOSTRIL DAILY], #48 (forty eight) grams, Refills: 1 (one)         Radiology/Test Orders:       88426  Radiologic examination, spine, cervical; minimum of four views  (Send-Out)            08466  US, soft tissues of head & neck (eg, thyroid, parathyroid, parotid), real time w/image documentation  (Send-Out)            3017F  Colorectal CA screen results documented and reviewed (PV)  (In-House)              Lab Orders:       *  [employer] Medicare screening PSA  (Send-Out)            34053  COMP - [facility] Comp. Metabolic Panel  (Send-Out)            08080  DP - [facility] Lipid Panel  (Send-Out)            APPTO  Appointment need  (In-House)                      Plan:         CervicalgiaConsider US if no findings on xray.         RADIOLOGY:  I have ordered a C-Spine x-ray series to be done today.      RECOMMENDATIONS given include: Further recommendation to be given after test results are complete.            Orders:       [id number]  Radiologic examination, spine, cervical; minimum of four views  (Send-Out)              Dysthymic disorderStable    MIPS Vaccines Flu and Pneumonia updated in Shot record Colorectal Cancer Screening is up to date and the results are in the chart     FOLLOW-UP: Schedule a follow-up visit in 6 months.:for Annual Checkup           Prescriptions:       [Refilled] DULoxetine 60 mg oral capsule,delayed release (enteric coated) [TAKE 1 CAPSULE BY MOUTH EVERY DAY IN ADDITION TO 20 MG CAPSULE TO EQUAL TOTAL DOSE OF 80 MG], #90 (ninety) capsules, Refills: 1 (one)       [Refilled] DULoxetine 20 mg oral capsule,delayed release (enteric coated) [TAKE 1 CAPSULE(20 MG) BY MOUTH EVERY DAY], #90 (ninety) capsules, Refills: 1 (one)           Orders:       3017F  Colorectal CA screen results documented and reviewed (PV)  (In-House)            APPTO  Appointment need  (In-House)              Paroxysmal atrial fibrillationFlecainide to be ordered by cardiology          Prescriptions:        [Refilled] metoprolol succinate 100 mg oral Tablet, Extended Release 24 hr [TAKE 1 TABLET BY MOUTH DAILY], #90 (ninety) tablets, Refills: 1 (one)       [Refilled] aspirin 81 mg oral tablet,chewable [chew 1 tablet (81 mg) by oral route once daily], #90 (ninety) tablets, Refills: 1 (one)         Encounter for screening for cardiovascular disorders    LABORATORY:  Labs ordered to be performed today include Comprehensive metabolic panel and lipid panel.      RECOMMENDATIONS given include: Further recommendation to be given after test results are complete.            Orders:       [id number]  [facility] Comp. Metabolic Panel  (Send-Out)            79711  [facility] Lipid Panel  (Send-Out)              Encounter for screening for malignant neoplasm of prostate    LABORATORY:  Labs ordered to be performed today include PSA.            Orders:       *  [employer] Medicare screening PSA  (Send-Out)              Mixed hyperlipidemia          Prescriptions:       [Refilled] atorvastatin 20 mg oral tablet [TAKE 1 TABLET BY MOUTH DAILY], #90 (ninety) tablets, Refills: 1 (one)         Allergic rhinitis, unspecified        RECOMMENDATIONS given include: Further recommendation to be given after test results are complete.            Prescriptions:       [Refilled] cetirizine 10 mg oral tablet [take 1 tablet (10 mg) by oral route once daily], #90 (ninety) tablets, Refills: 1 (one)       [Refilled] fluticasone propionate 50 mcg/actuation Intranasal Spray, Suspension [SHAKE LIQUID AND USE 1 SPRAY(50 MCG) IN EACH NOSTRIL DAILY], #48 (forty eight) grams, Refills: 1 (one)         Localized swelling, mass and lump, neck        RADIOLOGY:  I have ordered Neck Ultrasound to be done today.      RECOMMENDATIONS given include: Further recommendation to be given after test results are complete.            Orders:       [id number]  US, soft tissues of head & neck (eg, thyroid, parathyroid, parotid), real time w/image documentation  (Send-Out)                   Patient Recommendations:        For  Dysthymic disorder:    Schedule a follow-up visit in 6 months.              Charge Capture:         Primary Diagnosis:     M54.2  Cervicalgia           Orders:      71150  Office/outpatient visit; established patient, level 4  (In-House)              F34.1  Dysthymic disorder           Orders:      3017F  Colorectal CA screen results documented and reviewed (PV)  (In-House)            APPTO  Appointment need  (In-House)              I48.0  Paroxysmal atrial fibrillation     Z13.6  Encounter for screening for cardiovascular disorders     Z12.5  Encounter for screening for malignant neoplasm of prostate     E78.2  Mixed hyperlipidemia     Z91.19  Patient's noncompliance with other medical treatment and regimen     J30.9  Allergic rhinitis, unspecified     R22.1  Localized swelling, mass and lump, neck

## 2021-05-18 NOTE — PROGRESS NOTES
Logan Landa  1970     Office/Outpatient Visit    Visit Date: Thu, Apr 16, 2020 10:46 am    Provider: Michell Villanueva N.P. (Assistant: Kathrine Edwards MA)    Location: [facility]        Electronically signed by Michell Villanueva N.P. on  04/16/2020 11:19:38 AM                             Subjective:        CC: Manuelito is a 50 year old White male.  Patient has complaints of nausea and diarrhea X 2 weeks         HPI: Manuelito consented to this telemedicine visit. This visit is being conducted over the telephone.    Manuelito reports that symptoms initially began about 2 weeks ago with nausea, diarrhea, subjective fever. Daughter and granddaughter that resides in his house had stomach bug. He was feeling better for a couple of days as well as others in the house but restarted with diarrhea again 2 days ago. Denies current fever, nausea, vomiting. Some abdominal cramping but this has improved. Has taken Immodium. Has ate a sandwich last night and banana today.     ROS:     CONSTITUTIONAL:  Positive for chills and fever ( subjective, cannot find thermometer, resolved ).      E/N/T:  Negative for ear pain and sore throat.      CARDIOVASCULAR:  Negative for chest pain and palpitations.      RESPIRATORY:  Negative for dyspnea and frequent wheezing.      GASTROINTESTINAL:  Positive for diarrhea and nausea ( improved ).   Negative for vomiting.      GENITOURINARY:  Negative for dysuria and polyuria.          Past Medical History / Family History / Social History:         Last Reviewed on 4/01/2020 01:23 PM by Michell Villanueva    Past Medical History:             PAST MEDICAL HISTORY         Atrial Fibrillation: cardiac ablation in 2016;     Sleep Apnea: uses CPAP;     Irritable Bowel Syndrome     renal failure after heat stroke, but no problems since     broken tib/fib, ankle surgeries     Anxiety     benign mole removal         CURRENT MEDICAL PROVIDERS:    Cardiologist: Dr. Armstrong    Orthopedist: Dr. CARPENTER          PREVENTIVE HEALTH MAINTENANCE             COLORECTAL CANCER SCREENING:; colonoscopy with normal results; [facility], 1995     PSA: was last done 3/20/19 with normal results         Surgical History:         sugery on crushed left pinky finger;     cardiac ablation for afib;         Family History:     Father: Cause of death was lung CA;  Arrhythmia ( afib )     Mother: melanoma         Social History:     Occupation: Former , Electric;     Marital Status:      Children: 3 children and custody of 4 grandchildren         Tobacco/Alcohol/Supplements:     Last Reviewed on 4/01/2020 12:49 PM by Adelaide Harvey    Tobacco: Current Smoker: He currently smokes every day, E-Cigarette.  Non-drinker         Substance Abuse History:     Last Reviewed on 12/17/2018 09:39 AM by Michell Villanueva    None         Mental Health History:     Last Reviewed on 12/17/2018 09:39 AM by Michell Villanueva        Anxiety         Communicable Diseases (eg STDs):     Last Reviewed on 12/17/2018 09:39 AM by Michell Villanueva    Reportable health conditions; NEGATIVE         Current Problems:     Last Reviewed on 12/17/2018 09:39 AM by Michell Villanueva    Anxiety disorder, unspecified    Sleep apnea, unspecified    Paroxysmal atrial fibrillation    Other abnormal glucose    Mixed hyperlipidemia    Obesity, unspecified    Autoimmune thyroiditis    Patient's noncompliance with other medical treatment and regimen    Nicotine dependence, unspecified, uncomplicated    Encounter for screening for malignant neoplasm of colon    Encounter for screening for malignant neoplasm of prostate        Immunizations:     Fluzone (3 + years dose) 11/1/2018        Allergies:     Last Reviewed on 4/16/2020 10:47 AM by Kathrine Edwards    Penicillins:      Clindamycin HCl:          Current Medications:     Last Reviewed on 4/16/2020 10:47 AM by Kathrine Edwards    Goody Powder - 1 PO QD     flecainide 100 mg oral tablet [Take 1 tablet(s) by mouth q12h]    atorvastatin 20  mg oral tablet [1 tab daily]    metoprolol succinate 100 mg oral Tablet, Extended Release 24 hr [TAKE 1 TABLET BY MOUTH DAILY (PATIENT NEEDS APPOINTMENT)]    DULoxetine 60 mg oral capsule,delayed release (enteric coated) [TAKE 1 CAPSULE BY MOUTH EVERY DAY]    aspirin 81 mg oral tablet,chewable [chew 1 tablet (81 mg) by oral route once daily]        Objective:        Exams:     PHYSICAL EXAM:     RESPIRATORY: no sounds of respiratory distress over telephone;     NEUROLOGIC: mental status: alert and oriented x 3;     PSYCHIATRIC: appropriate affect and demeanor; normal speech pattern;         Assessment:         K52.89   Other specified noninfective gastroenteritis and colitis           ORDERS:         Meds Prescribed:       [New Rx] promethazine 25 mg oral tablet [take 1/2 to 1 tablet (25 mg) by oral route every 8 hours as needed. May cause drowsiness. ], #30 (thirty) tablets, Refills: 0 (zero)                 Plan:         Other specified noninfective gastroenteritis and colitisF/U for persistent or worsening symptoms. Consider labs if no improvement.         RECOMMENDATIONS given include: clear liquid diet for now, and advance as tolerated, begin oral fluid replacement with a glucose and electrolyte containing solution, and avoidance of dairy products until better.  MIPS Vaccines Flu and Pneumonia updated in Shot record Telehealth: Verbal consent obtained for visit to occur via phone call; Staff, other than provider, present during telephone visit include Kathrine Edwards MA; Total time spent was 11 minutes; [id number]--[employer] E/M 5-10 minutes     FOLLOW-UP: Chronic visit follow up           Prescriptions:       [New Rx] promethazine 25 mg oral tablet [take 1/2 to 1 tablet (25 mg) by oral route every 8 hours as needed. May cause drowsiness. ], #30 (thirty) tablets, Refills: 0 (zero)             Patient Recommendations:        For  Other specified noninfective gastroenteritis and colitis:    You should replace fluid  losses by drinking frequent, small amounts of a glucose and electrolyte containing solution. There are several commercially available products. Avoid consuming dairy products such as milk or cheese until your diarrhea has fully resolved.              Charge Capture:         Primary Diagnosis:     K52.89  Other specified noninfective gastroenteritis and colitis           Orders:      [id number]  Phys/QHP telephone evaluation 5-10 min  (In-House)

## 2021-05-18 NOTE — PROGRESS NOTES
Logan LandaNicolas 1970     Office/Outpatient Visit    Visit Date: Wed, Mar 20, 2019 05:55 pm    Provider: Michell Villanueva N.P. (Assistant: Kathrine Edwards MA)    Location: [facility]        Electronically signed by Michell Villanueva N.P. on  03/20/2019 06:43:19 PM                             SUBJECTIVE:        CC:     Manuelito is a 49 year old White male.  This is a follow-up visit.  Patient presents today for three month follow up         HPI:     History of parox. afib. On Flecainide and Metoprolol. Has seen Dr. Armstrong. Wanted him to have stress test and Echo. History of cardiac ablation in 2016. He wants to see different cardiologist closer to home. No chest pain or palpitations.         Anxiety details; current treatment includes Duloxetine.  Has had increased feelings of anxiety recently.  Has been on Duloxetine since 2016. Previously on Effexor XR and Paxil at one point.         History of sleep apnea. Compliant with CPAP. Has not followed up recently. Previously saw [facility] sleep specialist. Had previously been referred to [facility] sleep center but had to cancel appt. Plans to call back to reschedule.         Three months ago had decreased TSH but other thyroid testing normal. He notes that he has had previous times in the past were his thyroid tests were abnormal. Has never seen endocrinologist.         On Atorvastatin for hyperlipidemia.                         ROS:     CONSTITUTIONAL:  Negative for chills and fever.      CARDIOVASCULAR:  Negative for chest pain and palpitations.      RESPIRATORY:  Negative for dyspnea and frequent wheezing.      GASTROINTESTINAL:  Negative for abdominal pain and vomiting.      INTEGUMENTARY:  Negative for rash.      NEUROLOGICAL:  Negative for dizziness and headaches.      PSYCHIATRIC:  Positive for anxiety, feelings of stress and (wakes up during the night).   Negative for suicidal thoughts.          PMH/FMH/SH:     Last Reviewed on 12/17/2018 09:39 AM by Rich  Michell    Past Medical History:             PAST MEDICAL HISTORY         Atrial Fibrillation: cardiac ablation in 2016;     Sleep Apnea: uses CPAP;     Irritable Bowel Syndrome     renal failure after heat stroke, but no problems since     broken tib/fib, ankle surgeries     Anxiety     benign mole removal         CURRENT MEDICAL PROVIDERS:    Cardiologist: Dr. Armstrong    Orthopedist: Dr. CARPENTER         PREVENTIVE HEALTH MAINTENANCE             COLORECTAL CANCER SCREENING:; colonoscopy with normal results; [facility], 1995         Surgical History:         sugery on crushed left pinky finger;      cardiac ablation for afib;         Family History:     Father: Cause of death was lung CA;  Arrhythmia ( afib )     Mother: melanoma         Social History:     Occupation: Former , Electric;     Marital Status:      Children: 3 children and custody of 4 grandchildren         Tobacco/Alcohol/Supplements:     Last Reviewed on 12/17/2018 09:39 AM by Michell Villanueva    Tobacco: Current Smoker: He currently smokes every day, E-Cigarette.  Non-drinker         Substance Abuse History:     Last Reviewed on 12/17/2018 09:39 AM by Michell Villanueva    None         Mental Health History:     Last Reviewed on 12/17/2018 09:39 AM by Michell Villanueva        Anxiety         Communicable Diseases (eg STDs):     Last Reviewed on 12/17/2018 09:39 AM by Michell Villanueva    Reportable health conditions; NEGATIVE             Current Problems:     Last Reviewed on 12/17/2018 09:39 AM by Michell Villanueva    Hyperlipidemia     Elevated fasting glucose     Sleep apnea     Anxiety     Paroxysmal atrial fibrillation         Immunizations:     Fluzone (3 + years dose) 11/1/2018         Allergies:     Last Reviewed on 12/17/2018 09:39 AM by Michell Villanueva    Penicillins:    Clindamycin HCl:        Current Medications:     Last Reviewed on 12/17/2018 09:39 AM by Michell Villanueva    Flecainide Acetate 100mg Tablet Take 1 tablet(s) by mouth q12h     Duloxetine  HCl 40mg Capsules, Delayed Release Take 1 capsule(s) by mouth daily     Goody Powder - 1 PO QD         OBJECTIVE:        Vitals:         Current: 3/20/2019 6:00:39 PM    Ht:  5 ft, 11 in;  Wt: 279.8 lbs;  BMI: 39.0    T: 97.6 F (oral);  BP: 156/92 mm Hg (left arm, sitting);  P: 63 bpm (left arm (BP Cuff), sitting);  sCr: 1 mg/dL;  GFR: 104.04        Repeat:     6:00:47 PM     BP:   144/80mm Hg (left arm, sitting)         Exams:     PHYSICAL EXAM:     GENERAL: well developed, well nourished;  no apparent distress;     NECK: range of motion is normal; trachea is midline; thyroid is non-palpable;     RESPIRATORY: normal respiratory rate and pattern with no distress; normal breath sounds with no rales, rhonchi, wheezes or rubs;     CARDIOVASCULAR: normal rate; rhythm is regular;     NEUROLOGIC: mental status: alert and oriented x 3; GROSSLY INTACT     PSYCHIATRIC: appropriate affect and demeanor;         ASSESSMENT           427.31   I48.0  Paroxysmal atrial fibrillation              DDx:     300.02   F41.9  Anxiety              DDx:     272.4   E78.2  Hyperlipidemia              DDx:     V76.49   Z12.11  Screening for colon cancer              DDx:     V76.44   Z12.5  Screening for prostate cancer              DDx:     794.5   R94.6  Abnormal thyroid findings              DDx:     780.57   G47.30  Sleep apnea              DDx:     278.00   E66.9  Obesity, unspecified              DDx:         ORDERS:         Meds Prescribed:       Refill of: Duloxetine HCl 60mg Capsules, Delayed Release Take 1 capsule(s) by mouth daily  #90 (Ninety) capsule(s) Refills: 0       Refill of: Atorvastatin Calcium 20mg Tablet 1 tab daily  #90 (Ninety) tablet(s) Refills: 0       Refill of: Metoprolol Succinate 100mg Tablets, Extended Release Take 1 tablet by mouth daily  #90 (Ninety) tablet(s) Refills: 0       Refill of: Flecainide Acetate 100mg Tablet Take 1 tablet(s) by mouth q12h  #180 (One Goodman and Eighty) tablet(s) Refills: 2          Lab Orders:       *  PRSAS Medicare screening PSA  (Send-Out)         93133  THYII - [facility] Thyroid panel with TSH (61487, 48786)  (Send-Out)         APPTO  Appointment need  (In-House)           Procedures Ordered:       REFER  Referral to Specialist or Other Facility  (Send-Out)         REFER  Referral to Specialist or Other Facility  (Send-Out)           Other Orders:         Calculated BMI above the upper parameter and a follow-up plan was documented in the medical record  (In-House)                   PLAN:           Paroxysmal atrial fibrillation Will refill meds until can be seen by cardiology.         REFERRALS:  Referral initiated to a cardiologist ( Dr. Franklyn Kenyon, [facility] ).            Prescriptions:       Refill of: Metoprolol Succinate 100mg Tablets, Extended Release Take 1 tablet by mouth daily  #90 (Ninety) tablet(s) Refills: 0       Refill of: Flecainide Acetate 100mg Tablet Take 1 tablet(s) by mouth q12h  #180 (One [place] and Eighty) tablet(s) Refills: 2           Orders:       REFER  Referral to Specialist or Other Facility  (Send-Out)            Anxiety Will increase Duloxetine to 60 mg daily. Seek care immediately for worsening symptoms such as suicidal ideation.         FOLLOW-UP: Schedule follow-up appointments on a p.r.n. basis. Schedule a follow-up visit in 3 months. Patient will call to schedule follow-up appointment           Prescriptions:       Refill of: Duloxetine HCl 60mg Capsules, Delayed Release Take 1 capsule(s) by mouth daily  #90 (Ninety) capsule(s) Refills: 0           Orders:       APPTO  Appointment need  (In-House)            Hyperlipidemia Stable.           Prescriptions:       Refill of: Atorvastatin Calcium 20mg Tablet 1 tab daily  #90 (Ninety) tablet(s) Refills: 0          Screening for colon cancer         REFERRALS:  Referral initiated to a general surgeon ( Dr. Fredy Bess; a colonoscopy ).            Orders:       REFER   Referral to Specialist or Other Facility  (Send-Out)            Screening for prostate cancer     LABORATORY:  Labs ordered to be performed today include PSA.      RECOMMENDATIONS given include: Further recommendation to be given after test results are complete.            Orders:       *  PRSAS Medicare screening PSA  (Send-Out)            Abnormal thyroid findings     LABORATORY:  Labs ordered to be performed today include Thyroid Panel.      RECOMMENDATIONS given include: Further recommendation to be given after test results are complete.            Orders:       80539  THY - [facility] Thyroid panel with TSH (81098, 03296)  (Send-Out)            Sleep apnea Recommend rescheduling with sleep center. Discussed how sleep apnea can affect health. He is compliant with current CPAP but may need adjustments made. Will call and reschedule.          Obesity, unspecified     MIPS Vaccines Flu and Pneumonia updated in Shot record     BMI Elevated - Follow-Up Plan: He was provided education on weight loss strategies           Orders:         Calculated BMI above the upper parameter and a follow-up plan was documented in the medical record  (In-House)               Patient Recommendations:        For  Anxiety:     Schedule follow-up appointments as needed. Schedule a follow-up visit in 3 months.              CHARGE CAPTURE           **Please note: ICD descriptions below are intended for billing purposes only and may not represent clinical diagnoses**        Primary Diagnosis:         427.31 Paroxysmal atrial fibrillation            I48.0    Paroxysmal atrial fibrillation              Orders:          14369   Office/outpatient visit; established patient, level 4  (In-House)           300.02 Anxiety            F41.9    Anxiety disorder, unspecified              Orders:          APPTO   Appointment need  (In-House)           272.4 Hyperlipidemia            E78.2    Mixed hyperlipidemia    V76.49 Screening for colon cancer             Z12.11    Encounter for screening for malignant neoplasm of colon    V76.44 Screening for prostate cancer            Z12.5    Encounter for screening for malignant neoplasm of prostate    794.5 Abnormal thyroid findings            R94.6    Abnormal results of thyroid function studies    780.57 Sleep apnea            G47.30    Sleep apnea, unspecified    278.00 Obesity, unspecified            E66.9    Obesity, unspecified              Orders:             Calculated BMI above the upper parameter and a follow-up plan was documented in the medical record  (In-House)

## 2021-05-18 NOTE — PROGRESS NOTES
Logan Landa SHARONDA  1970     Office/Outpatient Visit    Visit Date: Thu, Sep 24, 2020 09:18 am    Provider: Michell Villanueva N.P. (Assistant: Mandy Beckwith MA)    Location: [facility]        Electronically signed by Michell Villanueva N.P. on  09/24/2020 09:50:49 AM                             Subjective:        CC: Manuelito is a 50 year old White male.  This is a follow-up visit.  med refills         HPI: History of parox. afib. On Flecainide and Metoprolol. History of cardiac ablation in 2016. Saw Dr Kenyon who did not make any changes to treatment.         Anxiety details; current treatment includes Duloxetine. Has been on Duloxetine since 2016. Previously on Effexor XR and Paxil at one point.  Denies suicidal ideation but has felt increasingly down. Thinks that he may need dose increased.         History of sleep apnea. Compliant with CPAP. Previously saw [facility] sleep specialist. Has not followed up on scheduling sleep center appt locally despite referral being placed. He has phone number to call and reschedule from where appt was postponed with coronavirus.         On Atorvastatin for hyperlipidemia. Normal lipid panel 5/20/2020.          PHQ-9 Depression Screening: Completed form scanned and in chart; Total Score 9     ROS:     CONSTITUTIONAL:  Negative for chills and fever.      CARDIOVASCULAR:  Negative for chest pain and palpitations.      RESPIRATORY:  Negative for dyspnea and frequent wheezing.      GASTROINTESTINAL:  Negative for abdominal pain and vomiting.      NEUROLOGICAL:  Negative for dizziness and headaches.      HEMATOLOGIC/LYMPHATIC:  Negative for easy bruising and lymphadenopathy.      PSYCHIATRIC:  Positive for depression and feelings of stress.   Negative for suicidal thoughts.          Past Medical History / Family History / Social History:         Last Reviewed on 9/24/2020 09:30 AM by Michell Villanueva    Past Medical History:             PAST MEDICAL HISTORY         Atrial  Fibrillation: cardiac ablation in 2016;     Sleep Apnea: uses CPAP;     Irritable Bowel Syndrome     renal failure after heat stroke, but no problems since     broken tib/fib, ankle surgeries     Anxiety     benign mole removal         CURRENT MEDICAL PROVIDERS:    Cardiologist: Dr Kenyon    Orthopedist: Dr. CARPENTER Endocrinologist         PREVENTIVE HEALTH MAINTENANCE             COLORECTAL CANCER SCREENING: Up to date (colonoscopy q10y; sigmoidoscopy q5y; Cologuard q3y) was last done 7/15/2020, Results are in chart; colonoscopy with normal results     PSA: was last done 5/20/20 with normal results         Surgical History:         sugery on crushed left pinky finger;     cardiac ablation for afib;         Family History:     Father: Cause of death was lung CA;  Arrhythmia ( afib )     Mother: melanoma         Social History:     Occupation: Former , Electric;     Marital Status:      Children: 3 children and custody of 4 grandchildren         Tobacco/Alcohol/Supplements:     Last Reviewed on 9/24/2020 09:20 AM by Mandy Beckwith    Tobacco: Current Smoker: He currently smokes every day, E-Cigarette.  Non-drinker         Substance Abuse History:     Last Reviewed on 12/17/2018 09:39 AM by Michell Villanueav    None         Mental Health History:     Last Reviewed on 12/17/2018 09:39 AM by Michell Villanueva        Anxiety         Communicable Diseases (eg STDs):     Last Reviewed on 12/17/2018 09:39 AM by Michell Villanueva    Reportable health conditions; NEGATIVE         Current Problems:     Last Reviewed on 12/17/2018 09:39 AM by Michell Villanueva    Anxiety disorder, unspecified    Sleep apnea, unspecified    Paroxysmal atrial fibrillation    Mixed hyperlipidemia    Other abnormal glucose    Obesity, unspecified    Autoimmune thyroiditis    Patient's noncompliance with other medical treatment and regimen    Encounter for screening for malignant neoplasm of colon    Encounter for screening for malignant neoplasm of  prostate    Nicotine dependence, unspecified, uncomplicated    Other specified noninfective gastroenteritis and colitis    Encounter for screening for depression    Encounter for immunization        Immunizations:     Fluzone (3 + years dose) 11/1/2018        Allergies:     Last Reviewed on 9/24/2020 09:20 AM by Mandy Beckwith    Penicillins:      Clindamycin HCl:          Current Medications:     Last Reviewed on 9/24/2020 09:20 AM by Mandy Beckwith    DULoxetine 60 mg oral capsule,delayed release (enteric coated) [TAKE 1 CAPSULE BY MOUTH EVERY DAY]    metoprolol succinate 100 mg oral Tablet, Extended Release 24 hr [TAKE 1 TABLET BY MOUTH DAILY]    atorvastatin 20 mg oral tablet [1 tab daily]    flecainide 100 mg oral tablet [Take 1 tablet(s) by mouth q12h]    Goody Powder - 1 PO QD     aspirin 81 mg oral tablet,chewable [chew 1 tablet (81 mg) by oral route once daily]    promethazine 25 mg oral tablet [take 1/2 to 1 tablet (25 mg) by oral route every 8 hours as needed. May cause drowsiness. ]        Objective:        Vitals:         Historical:     8/8/2019  BP:   130/82 mm Hg ( (left arm, , sitting, );) 8/8/2019  HR:   74bpm8/8/2019  Wt:   279.8lbs    Current: 9/24/2020 9:22:18 AM    Ht:  5 ft, 11 in;  Wt: 283.6 lbs;  BMI: 39.6T: 97.3 F (oral);  BP: 123/75 mm Hg (left arm, sitting);  P: 75 bpm (left arm (BP Cuff), sitting);  sCr: 1.09 mg/dL;  GFR: 94.96        Exams:     PHYSICAL EXAM:     GENERAL: well developed, well nourished;  no apparent distress;     RESPIRATORY: normal respiratory rate and pattern with no distress; normal breath sounds with no rales, rhonchi, wheezes or rubs;     CARDIOVASCULAR: normal rate; rhythm is regular;     NEUROLOGIC: mental status: alert and oriented x 3; GROSSLY INTACT     PSYCHIATRIC: appropriate affect and demeanor; normal psychomotor function; normal speech pattern; normal thought and perception;         Assessment:         F34.1   Dysthymic disorder       G47.30    Sleep apnea, unspecified       I48.0   Paroxysmal atrial fibrillation       E78.2   Mixed hyperlipidemia       Z13.31   Encounter for screening for depression       Z23   Encounter for immunization           ORDERS:         Meds Prescribed:       [Refilled] DULoxetine 60 mg oral capsule,delayed release (enteric coated) [TAKE 1 CAPSULE BY MOUTH EVERY DAY in addition to 20 mg capsule to equal total dose of 80 mg], #90 (ninety) capsules, Refills: 0 (zero)       [New Rx] DULoxetine 20 mg oral capsule,delayed release (enteric coated) [take 1 capsule (20 mg) by oral route once daily], #90 (ninety) capsules, Refills: 0 (zero)       [Refilled] metoprolol succinate 100 mg oral Tablet, Extended Release 24 hr [TAKE 1 TABLET BY MOUTH DAILY], #90 (ninety) tablets, Refills: 0 (zero)       [Refilled] atorvastatin 20 mg oral tablet [1 tab daily], #90 (ninety) tablets, Refills: 0 (zero)       [Refilled] flecainide 100 mg oral tablet [Take 1 tablet(s) by mouth q12h], #180 (one hundred and eighty) tablets, Refills: 0 (zero)         Radiology/Test Orders:       3017F  Colorectal CA screen results documented and reviewed (PV)  (In-House)              Lab Orders:       APPTO  Appointment need  (In-House)              Procedures Ordered:       27378  Immunization administration; one vaccine  (In-House)              Other Orders:       66204  Influenza virus vaccine, quadrivalent, split virus, preservative free 3 years of age & older  (In-House)              Depression screen positive and follow up plan documented  (In-House)                      Plan:         Dysthymic disorderWill increase dose of Duloxetine to 80 mg daily. F/u 3 months. Call for any concerns.    MIPS Vaccines Flu and Pneumonia updated in Shot record Colorectal Cancer Screening is up to date and the results are in the chart     FOLLOW-UP: Schedule a follow-up visit in 3 months.            Prescriptions:       [Refilled] DULoxetine 60 mg oral capsule,delayed release  (enteric coated) [TAKE 1 CAPSULE BY MOUTH EVERY DAY in addition to 20 mg capsule to equal total dose of 80 mg], #90 (ninety) capsules, Refills: 0 (zero)       [New Rx] DULoxetine 20 mg oral capsule,delayed release (enteric coated) [take 1 capsule (20 mg) by oral route once daily], #90 (ninety) capsules, Refills: 0 (zero)           Orders:       APPTO  Appointment need  (In-House)            3017F  Colorectal CA screen results documented and reviewed (PV)  (In-House)              Sleep apnea, unspecifiedHe plans on calling and making appt with sleep center. Will call if he needs additional referral.         Paroxysmal atrial fibrillationFollowed by Dr Kenyon. Will have cardiology start filling flecainide. Will provide one time rx until he can get refill from cardiology so that he does not run out. Additional refills will come from cardiology.           Prescriptions:       [Refilled] metoprolol succinate 100 mg oral Tablet, Extended Release 24 hr [TAKE 1 TABLET BY MOUTH DAILY], #90 (ninety) tablets, Refills: 0 (zero)       [Refilled] flecainide 100 mg oral tablet [Take 1 tablet(s) by mouth q12h], #180 (one hundred and eighty) tablets, Refills: 0 (zero)         Mixed hyperlipidemiaStable. Plan for labs at next visit.           Prescriptions:       [Refilled] atorvastatin 20 mg oral tablet [1 tab daily], #90 (ninety) tablets, Refills: 0 (zero)         Encounter for screening for depression    MIPS PHQ-9 Depression Screening: Completed form scanned and in chart; Total Score 9 Positive Depression Screen: Suicide Risk Assessment completed--denies suicidal/homicidal ideation; Pharmacologic intervention initiated/modified           Orders:         Depression screen positive and follow up plan documented  (In-House)              Encounter for immunization          Immunizations:       52992  Immunization administration; one vaccine  (In-House)            78595  Influenza virus vaccine, quadrivalent, split virus,  preservative free 3 years of age & older  (In-House)                Dose (ml): 0.5  Site: left deltoid  Route: intramuscular  Administered by: Mandy Beckwith          : [employer]  Lot #: [id number]  Exp: 06/30/2021          NDC: [address]-67            Patient Recommendations:        For  Dysthymic disorder:    Schedule a follow-up visit in 3 months.              Charge Capture:         Primary Diagnosis:     F34.1  Dysthymic disorder           Orders:      05450  Office/outpatient visit; established patient, level 4  (In-House)            APPTO  Appointment need  (In-House)            3017F  Colorectal CA screen results documented and reviewed (PV)  (In-House)              G47.30  Sleep apnea, unspecified     I48.0  Paroxysmal atrial fibrillation     E78.2  Mixed hyperlipidemia     Z13.31  Encounter for screening for depression           Orders:        Depression screen positive and follow up plan documented  (In-House)              Z23  Encounter for immunization           Orders:      13457  Immunization administration; one vaccine  (In-House)            97611  Influenza virus vaccine, quadrivalent, split virus, preservative free 3 years of age & older  (In-House)

## 2021-06-07 ENCOUNTER — TELEPHONE (OUTPATIENT)
Dept: FAMILY MEDICINE CLINIC | Age: 51
End: 2021-06-07

## 2021-06-07 NOTE — TELEPHONE ENCOUNTER
annie     Caller: Nupur Landa    Relationship: Emergency Contact    Best call back number: [phone]    What is the medical concern/diagnosis: NECK PAIN    What specialty or service is being requested: ULTRASOUND    What is the provider, practice or medical service name: N/A    What is the office location: N/A    What is the office phone number: N/A    Any additional details: THE PATIENT'S WIFE STATED THERE WAS SUPPOSED TO BE A REFERRAL FOR AN ULTRASOUND FOR THE PATIENT SEVERAL WEEKS AGO FOR THE PAIN AND KNOT IN HIS NECK. THE PATIENT WOULD LIKE A CALL BACK WITH AN UPDATE ABOUT HIS REFERRAL ASAP.

## 2021-06-30 ENCOUNTER — HOSPITAL ENCOUNTER (OUTPATIENT)
Dept: GENERAL RADIOLOGY | Facility: HOSPITAL | Age: 51
Discharge: HOME | End: 2021-06-30

## 2021-06-30 ENCOUNTER — LAB (OUTPATIENT)
Dept: LAB | Facility: HOSPITAL | Age: 51
End: 2021-06-30

## 2021-06-30 ENCOUNTER — OFFICE VISIT (OUTPATIENT)
Dept: FAMILY MEDICINE CLINIC | Age: 51
End: 2021-06-30

## 2021-06-30 VITALS
WEIGHT: 293.2 LBS | HEIGHT: 71 IN | HEART RATE: 68 BPM | DIASTOLIC BLOOD PRESSURE: 73 MMHG | TEMPERATURE: 98.2 F | BODY MASS INDEX: 41.05 KG/M2 | SYSTOLIC BLOOD PRESSURE: 131 MMHG

## 2021-06-30 DIAGNOSIS — M25.561 ACUTE PAIN OF RIGHT KNEE: ICD-10-CM

## 2021-06-30 DIAGNOSIS — R22.9 LOCALIZED SKIN MASS, LUMP, OR SWELLING: ICD-10-CM

## 2021-06-30 DIAGNOSIS — E06.3 HASHIMOTO'S THYROIDITIS: ICD-10-CM

## 2021-06-30 DIAGNOSIS — R79.89 ABNORMAL THYROID BLOOD TEST: ICD-10-CM

## 2021-06-30 DIAGNOSIS — L08.9 SKIN INFECTION: Primary | ICD-10-CM

## 2021-06-30 LAB
T-UPTAKE NFR SERPL: 1.08 TBI (ref 0.8–1.3)
T4 SERPL-MCNC: 5.07 MCG/DL (ref 4.5–11.7)
TSH SERPL DL<=0.05 MIU/L-ACNC: 0.86 UIU/ML (ref 0.27–4.2)

## 2021-06-30 PROCEDURE — 73560 X-RAY EXAM OF KNEE 1 OR 2: CPT

## 2021-06-30 PROCEDURE — 96372 THER/PROPH/DIAG INJ SC/IM: CPT | Performed by: NURSE PRACTITIONER

## 2021-06-30 PROCEDURE — 86376 MICROSOMAL ANTIBODY EACH: CPT

## 2021-06-30 PROCEDURE — 84443 ASSAY THYROID STIM HORMONE: CPT

## 2021-06-30 PROCEDURE — 36415 COLL VENOUS BLD VENIPUNCTURE: CPT

## 2021-06-30 PROCEDURE — 99214 OFFICE O/P EST MOD 30 MIN: CPT | Performed by: NURSE PRACTITIONER

## 2021-06-30 PROCEDURE — 84436 ASSAY OF TOTAL THYROXINE: CPT

## 2021-06-30 PROCEDURE — 84479 ASSAY OF THYROID (T3 OR T4): CPT

## 2021-06-30 RX ORDER — SULFAMETHOXAZOLE AND TRIMETHOPRIM 800; 160 MG/1; MG/1
1 TABLET ORAL 2 TIMES DAILY
Qty: 20 TABLET | Refills: 0 | Status: SHIPPED | OUTPATIENT
Start: 2021-06-30 | End: 2021-08-16

## 2021-06-30 RX ORDER — CETIRIZINE HYDROCHLORIDE 10 MG/1
1 TABLET ORAL DAILY PRN
COMMUNITY
Start: 2021-04-22 | End: 2021-08-16

## 2021-06-30 RX ORDER — ASPIRIN 81 MG
81 TABLET,CHEWABLE ORAL DAILY
COMMUNITY
Start: 2021-04-22

## 2021-06-30 RX ORDER — FLUTICASONE PROPIONATE 50 MCG
SPRAY, SUSPENSION (ML) NASAL DAILY PRN
COMMUNITY
Start: 2021-04-22

## 2021-06-30 RX ORDER — KETOROLAC TROMETHAMINE 30 MG/ML
30 INJECTION, SOLUTION INTRAMUSCULAR; INTRAVENOUS ONCE
Status: COMPLETED | OUTPATIENT
Start: 2021-06-30 | End: 2021-06-30

## 2021-06-30 RX ADMIN — KETOROLAC TROMETHAMINE 30 MG: 30 INJECTION, SOLUTION INTRAMUSCULAR; INTRAVENOUS at 17:44

## 2021-07-01 VITALS
WEIGHT: 279.8 LBS | HEIGHT: 71 IN | BODY MASS INDEX: 39.17 KG/M2 | TEMPERATURE: 98.3 F | SYSTOLIC BLOOD PRESSURE: 130 MMHG | HEART RATE: 78 BPM | DIASTOLIC BLOOD PRESSURE: 82 MMHG

## 2021-07-01 VITALS
BODY MASS INDEX: 39.17 KG/M2 | SYSTOLIC BLOOD PRESSURE: 144 MMHG | TEMPERATURE: 97.6 F | HEIGHT: 71 IN | HEART RATE: 63 BPM | DIASTOLIC BLOOD PRESSURE: 80 MMHG | WEIGHT: 279.8 LBS

## 2021-07-01 VITALS
HEART RATE: 81 BPM | SYSTOLIC BLOOD PRESSURE: 105 MMHG | WEIGHT: 280.8 LBS | DIASTOLIC BLOOD PRESSURE: 71 MMHG | HEIGHT: 71 IN | BODY MASS INDEX: 39.31 KG/M2 | TEMPERATURE: 97.8 F

## 2021-07-01 VITALS
TEMPERATURE: 98.4 F | HEART RATE: 61 BPM | SYSTOLIC BLOOD PRESSURE: 132 MMHG | DIASTOLIC BLOOD PRESSURE: 81 MMHG | HEIGHT: 71 IN | WEIGHT: 277 LBS | BODY MASS INDEX: 38.78 KG/M2

## 2021-07-01 LAB — LKM-1 AB SER-ACNC: <1 UNITS (ref 0–20)

## 2021-07-02 VITALS
TEMPERATURE: 99.7 F | HEIGHT: 71 IN | SYSTOLIC BLOOD PRESSURE: 125 MMHG | DIASTOLIC BLOOD PRESSURE: 80 MMHG | WEIGHT: 292 LBS | BODY MASS INDEX: 40.88 KG/M2 | HEART RATE: 72 BPM

## 2021-07-02 VITALS
SYSTOLIC BLOOD PRESSURE: 150 MMHG | TEMPERATURE: 96.9 F | HEART RATE: 67 BPM | DIASTOLIC BLOOD PRESSURE: 93 MMHG | HEIGHT: 71 IN | WEIGHT: 298.8 LBS | BODY MASS INDEX: 41.83 KG/M2

## 2021-07-02 VITALS
HEIGHT: 71 IN | WEIGHT: 283.6 LBS | HEART RATE: 75 BPM | BODY MASS INDEX: 39.7 KG/M2 | SYSTOLIC BLOOD PRESSURE: 123 MMHG | TEMPERATURE: 97.3 F | DIASTOLIC BLOOD PRESSURE: 75 MMHG

## 2021-07-12 ENCOUNTER — LAB (OUTPATIENT)
Dept: LAB | Facility: HOSPITAL | Age: 51
End: 2021-07-12

## 2021-07-12 DIAGNOSIS — R79.89 ABNORMAL THYROID BLOOD TEST: ICD-10-CM

## 2021-07-12 PROCEDURE — 36415 COLL VENOUS BLD VENIPUNCTURE: CPT

## 2021-07-12 PROCEDURE — 86376 MICROSOMAL ANTIBODY EACH: CPT

## 2021-07-13 LAB — THYROPEROXIDASE AB SERPL-ACNC: 65 IU/ML (ref 0–34)

## 2021-07-26 ENCOUNTER — HOSPITAL ENCOUNTER (OUTPATIENT)
Dept: ULTRASOUND IMAGING | Facility: HOSPITAL | Age: 51
Discharge: HOME | End: 2021-07-26
Admitting: NURSE PRACTITIONER

## 2021-07-26 DIAGNOSIS — R22.9 LOCALIZED SKIN MASS, LUMP, OR SWELLING: ICD-10-CM

## 2021-07-26 PROCEDURE — 76536 US EXAM OF HEAD AND NECK: CPT

## 2021-08-16 ENCOUNTER — TRANSCRIBE ORDERS (OUTPATIENT)
Dept: ADMINISTRATIVE | Facility: HOSPITAL | Age: 51
End: 2021-08-16

## 2021-08-16 ENCOUNTER — OFFICE VISIT (OUTPATIENT)
Dept: FAMILY MEDICINE CLINIC | Age: 51
End: 2021-08-16

## 2021-08-16 VITALS
TEMPERATURE: 98.1 F | DIASTOLIC BLOOD PRESSURE: 82 MMHG | WEIGHT: 293 LBS | BODY MASS INDEX: 41.95 KG/M2 | OXYGEN SATURATION: 99 % | SYSTOLIC BLOOD PRESSURE: 141 MMHG | HEART RATE: 72 BPM | HEIGHT: 70 IN

## 2021-08-16 DIAGNOSIS — E06.3 HASHIMOTO'S DISEASE: Primary | ICD-10-CM

## 2021-08-16 DIAGNOSIS — M17.10 PATELLOFEMORAL ARTHROSIS: Primary | ICD-10-CM

## 2021-08-16 PROBLEM — R68.89 HEAT INTOLERANCE: Status: ACTIVE | Noted: 2021-08-12

## 2021-08-16 PROBLEM — I49.9 CARDIAC ARRHYTHMIA: Status: ACTIVE | Noted: 2021-08-12

## 2021-08-16 PROCEDURE — 99213 OFFICE O/P EST LOW 20 MIN: CPT | Performed by: NURSE PRACTITIONER

## 2021-08-16 RX ORDER — ESCITALOPRAM OXALATE 10 MG/1
1 TABLET ORAL DAILY
COMMUNITY
Start: 2021-07-17 | End: 2021-10-14

## 2021-08-16 RX ORDER — DULOXETIN HYDROCHLORIDE 60 MG/1
1 CAPSULE, DELAYED RELEASE ORAL DAILY
COMMUNITY
Start: 2021-07-23 | End: 2021-10-14

## 2021-08-16 RX ORDER — DULOXETIN HYDROCHLORIDE 20 MG/1
1 CAPSULE, DELAYED RELEASE ORAL DAILY
COMMUNITY
Start: 2021-07-20 | End: 2021-10-14

## 2021-08-16 NOTE — PROGRESS NOTES
"Chief Complaint  Knee Pain (RT (*note hx of same cc ))    Subjective          Logan Landa presents to [facility]    Logan presents with c/o right knee pain. Started approximately 2 months ago. Notes medial knee pain. No known injury. Sometimes locks and pops. Sitting for periods of longer than 15 minutes seem to aggravate. Worse going up steps. Xray on 6/30/21 showed mild patellofemoral arthrosis. He was prescribed diclofenac to take as needed. This only slightly helped.           Objective   Vital Signs:   /82   Pulse 72   Temp 98.1 °F (36.7 °C)   Ht 177.8 cm (70\")   Wt 133 kg (293 lb)   SpO2 99%   BMI 42.04 kg/m²     Physical Exam  Vitals reviewed.   Constitutional:       General: He is not in acute distress.     Appearance: Normal appearance. He is well-developed.   HENT:      Head: Normocephalic and atraumatic.   Cardiovascular:      Rate and Rhythm: Normal rate and regular rhythm.   Pulmonary:      Effort: Pulmonary effort is normal.      Breath sounds: Normal breath sounds.   Musculoskeletal:         General: Tenderness present. No deformity.   Neurological:      Mental Status: He is alert and oriented to person, place, and time.   Psychiatric:         Mood and Affect: Mood and affect normal.          Result Review :   The following data was reviewed by: FRANKY Saini on 08/16/2021:  Thyroid Peroxidase Antibody (07/12/2021 07:38)  Anti-microsomal Antibody (06/30/2021 17:57)  Thyroid Panel With TSH (06/30/2021 17:57)  Lipid Panel (05/18/2021 07:48)  Comprehensive Metabolic Panel (05/18/2021 07:48)  PSA Screen (05/18/2021 07:48)  XR Knee 1 or 2 View Right (06/30/2021 18:01)           Assessment and Plan    Diagnoses and all orders for this visit:    1. Patellofemoral arthrosis (Primary)  -     diclofenac (VOLTAREN) 50 MG EC tablet; Take 1 tablet by mouth 2 (Two) Times a Day As Needed (knee pain).  Dispense: 180 tablet; Refill: 0  -     Ambulatory Referral to " Orthopedic Surgery        Follow Up    Return in about 3 months (around 11/16/2021) for Annual physical.  Patient was given instructions and counseling regarding his condition or for health maintenance advice. Please see specific information pulled into the AVS if appropriate.

## 2021-08-23 RX ORDER — FLECAINIDE ACETATE 100 MG/1
TABLET ORAL
Qty: 180 TABLET | OUTPATIENT
Start: 2021-08-23

## 2021-08-23 RX ORDER — FLECAINIDE ACETATE 100 MG/1
TABLET ORAL
Qty: 60 TABLET | Refills: 0 | Status: SHIPPED | OUTPATIENT
Start: 2021-08-23 | End: 2021-10-07

## 2021-09-01 ENCOUNTER — APPOINTMENT (OUTPATIENT)
Dept: ULTRASOUND IMAGING | Facility: HOSPITAL | Age: 51
End: 2021-09-01

## 2021-10-07 RX ORDER — FLECAINIDE ACETATE 100 MG/1
TABLET ORAL
Qty: 180 TABLET | OUTPATIENT
Start: 2021-10-07

## 2021-10-07 RX ORDER — FLECAINIDE ACETATE 100 MG/1
TABLET ORAL
Qty: 60 TABLET | Refills: 0 | Status: SHIPPED | OUTPATIENT
Start: 2021-10-07 | End: 2021-12-09 | Stop reason: SDUPTHER

## 2021-10-14 RX ORDER — DULOXETIN HYDROCHLORIDE 20 MG/1
CAPSULE, DELAYED RELEASE ORAL
Qty: 90 CAPSULE | Refills: 0 | Status: SHIPPED | OUTPATIENT
Start: 2021-10-14 | End: 2021-10-18

## 2021-10-18 RX ORDER — DULOXETIN HYDROCHLORIDE 20 MG/1
CAPSULE, DELAYED RELEASE ORAL
Qty: 90 CAPSULE | Refills: 0 | Status: SHIPPED | OUTPATIENT
Start: 2021-10-18 | End: 2022-05-16

## 2021-10-18 RX ORDER — DULOXETIN HYDROCHLORIDE 60 MG/1
CAPSULE, DELAYED RELEASE ORAL
Qty: 90 CAPSULE | Refills: 0 | Status: SHIPPED | OUTPATIENT
Start: 2021-10-18 | End: 2022-02-17

## 2021-10-19 DIAGNOSIS — R73.09 ELEVATED HEMOGLOBIN A1C: ICD-10-CM

## 2021-10-19 DIAGNOSIS — I10 ESSENTIAL HYPERTENSION: ICD-10-CM

## 2021-10-19 DIAGNOSIS — G47.33 OBSTRUCTIVE SLEEP APNEA: ICD-10-CM

## 2021-10-19 DIAGNOSIS — I48.91 ATRIAL FIBRILLATION, UNSPECIFIED TYPE (HCC): ICD-10-CM

## 2021-10-19 DIAGNOSIS — E66.01 MORBID OBESITY DUE TO EXCESS CALORIES (HCC): ICD-10-CM

## 2021-10-19 DIAGNOSIS — Z98.890 HISTORY OF RADIOFREQUENCY ABLATION PROCEDURE FOR CARDIAC ARRHYTHMIA: ICD-10-CM

## 2021-10-19 DIAGNOSIS — F41.1 GENERALIZED ANXIETY DISORDER: ICD-10-CM

## 2021-10-20 ENCOUNTER — TELEPHONE (OUTPATIENT)
Dept: FAMILY MEDICINE CLINIC | Age: 51
End: 2021-10-20

## 2021-10-20 NOTE — TELEPHONE ENCOUNTER
HUB TO READ    PT called asking about why meds were not called in he stated he only got one of the two refills.

## 2021-10-21 RX ORDER — METOPROLOL SUCCINATE 100 MG/1
TABLET, EXTENDED RELEASE ORAL
Qty: 90 TABLET | Refills: 0 | Status: SHIPPED | OUTPATIENT
Start: 2021-10-21 | End: 2022-01-19 | Stop reason: SDUPTHER

## 2021-11-08 RX ORDER — FLECAINIDE ACETATE 100 MG/1
TABLET ORAL
Qty: 60 TABLET | Refills: 0 | OUTPATIENT
Start: 2021-11-08

## 2021-12-07 ENCOUNTER — TELEPHONE (OUTPATIENT)
Dept: CARDIOLOGY | Facility: CLINIC | Age: 51
End: 2021-12-07

## 2021-12-08 NOTE — TELEPHONE ENCOUNTER
Patient stated that he needed medication refilled.    Informed the patient that no medication refills can be given because he has not seen the doctor within the last year.     Patient stated that he was going to get a appointment scheduled.

## 2021-12-09 RX ORDER — FLECAINIDE ACETATE 100 MG/1
100 TABLET ORAL EVERY 12 HOURS
Qty: 60 TABLET | Refills: 1 | Status: SHIPPED | OUTPATIENT
Start: 2021-12-09 | End: 2021-12-10

## 2021-12-10 RX ORDER — FLECAINIDE ACETATE 100 MG/1
TABLET ORAL
Qty: 180 TABLET | Refills: 3 | Status: SHIPPED | OUTPATIENT
Start: 2021-12-10

## 2022-01-19 ENCOUNTER — OFFICE VISIT (OUTPATIENT)
Dept: FAMILY MEDICINE CLINIC | Age: 52
End: 2022-01-19

## 2022-01-19 VITALS
WEIGHT: 285 LBS | TEMPERATURE: 98 F | HEIGHT: 70 IN | DIASTOLIC BLOOD PRESSURE: 80 MMHG | SYSTOLIC BLOOD PRESSURE: 142 MMHG | HEART RATE: 79 BPM | BODY MASS INDEX: 40.8 KG/M2 | OXYGEN SATURATION: 97 %

## 2022-01-19 DIAGNOSIS — K58.0 IRRITABLE BOWEL SYNDROME WITH DIARRHEA: ICD-10-CM

## 2022-01-19 DIAGNOSIS — Z98.890 HISTORY OF RADIOFREQUENCY ABLATION PROCEDURE FOR CARDIAC ARRHYTHMIA: ICD-10-CM

## 2022-01-19 DIAGNOSIS — J06.9 UPPER RESPIRATORY INFECTION WITH COUGH AND CONGESTION: Primary | ICD-10-CM

## 2022-01-19 DIAGNOSIS — I10 ESSENTIAL HYPERTENSION: ICD-10-CM

## 2022-01-19 DIAGNOSIS — I48.91 ATRIAL FIBRILLATION, UNSPECIFIED TYPE: ICD-10-CM

## 2022-01-19 LAB
EXPIRATION DATE: NORMAL
EXPIRATION DATE: NORMAL
FLUAV AG NPH QL: NEGATIVE
FLUBV AG NPH QL: NEGATIVE
INTERNAL CONTROL: NORMAL
INTERNAL CONTROL: NORMAL
Lab: NORMAL
Lab: NORMAL
SARS-COV-2 AG UPPER RESP QL IA.RAPID: NOT DETECTED
SARS-COV-2 RNA PNL SPEC NAA+PROBE: DETECTED

## 2022-01-19 PROCEDURE — 87804 INFLUENZA ASSAY W/OPTIC: CPT | Performed by: NURSE PRACTITIONER

## 2022-01-19 PROCEDURE — U0004 COV-19 TEST NON-CDC HGH THRU: HCPCS | Performed by: NURSE PRACTITIONER

## 2022-01-19 PROCEDURE — 87426 SARSCOV CORONAVIRUS AG IA: CPT | Performed by: NURSE PRACTITIONER

## 2022-01-19 PROCEDURE — 99214 OFFICE O/P EST MOD 30 MIN: CPT | Performed by: NURSE PRACTITIONER

## 2022-01-19 RX ORDER — ESCITALOPRAM OXALATE 20 MG/1
20 TABLET ORAL DAILY
COMMUNITY
Start: 2021-11-18

## 2022-01-19 RX ORDER — HYOSCYAMINE SULFATE 0.125 MG
TABLET ORAL
Qty: 368 TABLET | OUTPATIENT
Start: 2022-01-19

## 2022-01-19 RX ORDER — TRAZODONE HYDROCHLORIDE 100 MG/1
100 TABLET ORAL NIGHTLY PRN
COMMUNITY
Start: 2021-11-18

## 2022-01-19 RX ORDER — METOPROLOL SUCCINATE 100 MG/1
100 TABLET, EXTENDED RELEASE ORAL DAILY
Qty: 90 TABLET | Refills: 0 | Status: SHIPPED | OUTPATIENT
Start: 2022-01-19 | End: 2022-05-16

## 2022-01-19 RX ORDER — HYOSCYAMINE SULFATE 0.125 MG
0.12 TABLET ORAL EVERY 6 HOURS PRN
Qty: 90 TABLET | Refills: 0 | Status: SHIPPED | OUTPATIENT
Start: 2022-01-19

## 2022-01-19 NOTE — PROGRESS NOTES
Chief Complaint  Logan Landa presents to [facility] for URI    Subjective          History of Present Illness    Logan is here today with c/o symptoms that started 2 days ago including body aches, sinus congestion, headache. Granddaughter and wife both recently tested positive for covid. Has not had covid or influenza vaccine.   History of IBS. Mother is taking hycosamine and wonders if he could try for his diarrhea.   Logan has history of parox afib. On Flecainide and metoprolol. History of cardiac ablation in 2016. Reports has follow up scheduled with cardiology.    Review of Systems      Allergies   Allergen Reactions   • Bactrim [Sulfamethoxazole-Trimethoprim] GI Intolerance   • Penicillins Hives      Past Medical History:   Diagnosis Date   • Atrial fibrillation (HCC)    • Essential hypertension    • Generalized anxiety disorder      Current Outpatient Medications   Medication Sig Dispense Refill   • atorvastatin (LIPITOR) 20 MG tablet TAKE 1 TABLET BY MOUTH DAILY. 90 tablet 0   • Nic Low Dose 81 MG chewable tablet Chew 81 mg Daily.     • diclofenac (VOLTAREN) 50 MG EC tablet Take 1 tablet by mouth 2 (Two) Times a Day As Needed (knee pain). 180 tablet 0   • DULoxetine (CYMBALTA) 20 MG capsule TAKE 1 CAPSULE(20 MG) BY MOUTH EVERY DAY 90 capsule 0   • DULoxetine (CYMBALTA) 60 MG capsule TAKE 1 CAPSULE BY MOUTH EVERY DAY IN ADDITION TO 20 MG CAPSULE TO EQUAL TOTAL DOSE OF 80 MG 90 capsule 0   • escitalopram (LEXAPRO) 20 MG tablet Take 20 mg by mouth Daily.     • fluticasone (FLONASE) 50 MCG/ACT nasal spray Daily As Needed.     • metoprolol succinate XL (TOPROL-XL) 100 MG 24 hr tablet Take 1 tablet by mouth Daily. 90 tablet 0   • traZODone (DESYREL) 100 MG tablet Take 100 mg by mouth At Night As Needed. for sleep     • flecainide (TAMBOCOR) 100 MG tablet TAKE 1 TABLET BY MOUTH EVERY 12 HOURS 180 tablet 3   • hyoscyamine (ANASPAZ,LEVSIN) 0.125 MG tablet Take 1 tablet by mouth  "Every 6 (Six) Hours As Needed for Cramping or Diarrhea. 90 tablet 0     No current facility-administered medications for this visit.     Past Surgical History:   Procedure Laterality Date   • CARDIAC SURGERY        Social History     Tobacco Use   • Smoking status: Former Smoker     Types: Cigarettes     Quit date:      Years since quittin.0   • Smokeless tobacco: Never Used   Vaping Use   • Vaping Use: Every day   • Substances: Nicotine   Substance Use Topics   • Alcohol use: Not Currently     Comment: quit drinking    • Drug use: Not Currently     Family History   Problem Relation Age of Onset   • Depression Mother    • Anxiety disorder Mother    • Heart attack Father    • Alcohol abuse Father    • Anxiety disorder Sister    • Depression Sister      Health Maintenance Due   Topic Date Due   • ANNUAL PHYSICAL  Never done   • TDAP/TD VACCINES (1 - Tdap) Never done   • HEPATITIS C SCREENING  Never done   • ZOSTER VACCINE (1 of 2) Never done      Immunization History   Administered Date(s) Administered   • Flucelvax Quad Vial =>4yrs 2020        Objective     Vitals:    22 0933   BP: 142/80   Pulse: 79   Temp: 98 °F (36.7 °C)   SpO2: 97%   Weight: 129 kg (285 lb)   Height: 177.8 cm (70\")     Body mass index is 40.89 kg/m².     Physical Exam  Vitals reviewed.   Constitutional:       General: He is not in acute distress.     Appearance: Normal appearance. He is well-developed.   HENT:      Head: Normocephalic and atraumatic.      Right Ear: Tympanic membrane and ear canal normal.      Left Ear: Tympanic membrane and ear canal normal.      Nose: Nose normal.      Mouth/Throat:      Mouth: Mucous membranes are moist.   Eyes:      Extraocular Movements: Extraocular movements intact.      Pupils: Pupils are equal, round, and reactive to light.   Cardiovascular:      Rate and Rhythm: Normal rate and regular rhythm.   Pulmonary:      Effort: Pulmonary effort is normal.      Breath sounds: Normal breath " sounds.   Abdominal:      Palpations: Abdomen is soft.      Tenderness: There is no abdominal tenderness.   Neurological:      Mental Status: He is alert and oriented to person, place, and time.   Psychiatric:         Mood and Affect: Mood and affect normal.           Result Review :     The following data was reviewed by: FRANKY Saini on 01/19/2022:          POCT SARS-CoV-2 Antigen DELORES (01/19/2022 09:51)  Thyroid Peroxidase Antibody (07/12/2021 07:38)  Anti-microsomal Antibody (06/30/2021 17:57)  Thyroid Panel With TSH (06/30/2021 17:57)  Lipid Panel (05/18/2021 07:48)  Comprehensive Metabolic Panel (05/18/2021 07:48)  PSA Screen (05/18/2021 07:48)                  Assessment and Plan      Diagnoses and all orders for this visit:    1. Upper respiratory infection with cough and congestion (Primary)  Comments:  Rest, fluids, symptomatic tx discussed. Quarantine discussed. Await PCR.  Declines rx cough med at time of visit.   Orders:  -     Cancel: POCT SARS-CoV-2 Antigen DELORES + Flu  -     POCT SARS-CoV-2 Antigen DELORES  -     COVID-19,APTIMA PANTHER(CHANO),[facility]/ [facility], NP/OP SWAB IN UTM/VTM/SALINE TRANSPORT MEDIA,24 HR TAT - Swab, Nasopharynx  -     POCT Influenza A/B    2. Irritable bowel syndrome with diarrhea  -     hyoscyamine (ANASPAZ,LEVSIN) 0.125 MG tablet; Take 1 tablet by mouth Every 6 (Six) Hours As Needed for Cramping or Diarrhea.  Dispense: 90 tablet; Refill: 0    3. Essential hypertension  -     metoprolol succinate XL (TOPROL-XL) 100 MG 24 hr tablet; Take 1 tablet by mouth Daily.  Dispense: 90 tablet; Refill: 0    4. History of radiofrequency ablation procedure for cardiac arrhythmia  Comments:  Continue flecainide and follow up with cardiology  Orders:  -     metoprolol succinate XL (TOPROL-XL) 100 MG 24 hr tablet; Take 1 tablet by mouth Daily.  Dispense: 90 tablet; Refill: 0    5. Atrial fibrillation, unspecified type (HCC)  Comments:  Sinus rhythm since ablation per patient's history  Orders:  -      metoprolol succinate XL (TOPROL-XL) 100 MG 24 hr tablet; Take 1 tablet by mouth Daily.  Dispense: 90 tablet; Refill: 0      Due for annual physical and labs. Will be scheduled prior to leaving office today.           Follow Up     Return for As needed for persistent or worsening symptoms.

## 2022-01-24 ENCOUNTER — TELEPHONE (OUTPATIENT)
Dept: FAMILY MEDICINE CLINIC | Age: 52
End: 2022-01-24

## 2022-01-24 NOTE — TELEPHONE ENCOUNTER
Pt states he will need a note for employer stating he is released to return to work on 1/30/22. Please advise.

## 2022-01-31 ENCOUNTER — HOSPITAL ENCOUNTER (OUTPATIENT)
Dept: GENERAL RADIOLOGY | Facility: HOSPITAL | Age: 52
Discharge: HOME | End: 2022-01-31
Admitting: NURSE PRACTITIONER

## 2022-01-31 ENCOUNTER — OFFICE VISIT (OUTPATIENT)
Dept: FAMILY MEDICINE CLINIC | Age: 52
End: 2022-01-31

## 2022-01-31 VITALS
HEIGHT: 70 IN | SYSTOLIC BLOOD PRESSURE: 151 MMHG | BODY MASS INDEX: 40.8 KG/M2 | HEART RATE: 71 BPM | OXYGEN SATURATION: 100 % | WEIGHT: 285 LBS | TEMPERATURE: 97.6 F | DIASTOLIC BLOOD PRESSURE: 89 MMHG

## 2022-01-31 DIAGNOSIS — M25.562 ACUTE PAIN OF LEFT KNEE: ICD-10-CM

## 2022-01-31 DIAGNOSIS — M25.562 ACUTE PAIN OF LEFT KNEE: Primary | ICD-10-CM

## 2022-01-31 PROCEDURE — 99213 OFFICE O/P EST LOW 20 MIN: CPT | Performed by: NURSE PRACTITIONER

## 2022-01-31 PROCEDURE — 73562 X-RAY EXAM OF KNEE 3: CPT

## 2022-01-31 RX ORDER — MELOXICAM 7.5 MG/1
7.5 TABLET ORAL DAILY
Qty: 14 TABLET | Refills: 0 | Status: SHIPPED | OUTPATIENT
Start: 2022-01-31 | End: 2022-02-14

## 2022-01-31 NOTE — PATIENT INSTRUCTIONS
Meloxicam tablets  What is this medicine?  MELOXICAM (marychuy OX i cam) is a non-steroidal anti-inflammatory drug (NSAID). It is used to reduce swelling and to treat pain. It may be used for osteoarthritis, rheumatoid arthritis, or juvenile rheumatoid arthritis.  This medicine may be used for other purposes; ask your health care provider or pharmacist if you have questions.  COMMON BRAND NAME(S): Phuong  What should I tell my health care provider before I take this medicine?  They need to know if you have any of these conditions:  · bleeding disorders  · cigarette smoker  · coronary artery bypass graft (CABG) surgery within the past 2 weeks  · drink more than 3 alcohol-containing drinks per day  · heart disease  · high blood pressure  · history of stomach bleeding  · kidney disease  · liver disease  · lung or breathing disease, like asthma  · stomach or intestine problems  · an unusual or allergic reaction to meloxicam, aspirin, other NSAIDs, other medicines, foods, dyes, or preservatives  · pregnant or trying to get pregnant  · breast-feeding  How should I use this medicine?  Take this medicine by mouth with a full glass of water. Follow the directions on the prescription label. You can take it with or without food. If it upsets your stomach, take it with food. Take your medicine at regular intervals. Do not take it more often than directed. Do not stop taking except on your doctor's advice.  A special MedGuide will be given to you by the pharmacist with each prescription and refill. Be sure to read this information carefully each time.  Talk to your pediatrician regarding the use of this medicine in children. While this drug may be prescribed for selected conditions, precautions do apply.  Patients over 65 years old may have a stronger reaction and need a smaller dose.  Overdosage: If you think you have taken too much of this medicine contact a poison control center or emergency room at once.  NOTE: This medicine is  only for you. Do not share this medicine with others.  What if I miss a dose?  If you miss a dose, take it as soon as you can. If it is almost time for your next dose, take only that dose. Do not take double or extra doses.  What may interact with this medicine?  Do not take this medicine with any of the following medications:  · cidofovir  · ketorolac  This medicine may also interact with the following medications:  · aspirin and aspirin-like medicines  · certain medicines for blood pressure, heart disease, irregular heart beat  · certain medicines for depression, anxiety, or psychotic disturbances  · certain medicines that treat or prevent blood clots like warfarin, enoxaparin, dalteparin, apixaban, dabigatran, rivaroxaban  · cyclosporine  · diuretics  · fluconazole  · lithium  · methotrexate  · other NSAIDs, medicines for pain and inflammation, like ibuprofen and naproxen  · pemetrexed  This list may not describe all possible interactions. Give your health care provider a list of all the medicines, herbs, non-prescription drugs, or dietary supplements you use. Also tell them if you smoke, drink alcohol, or use illegal drugs. Some items may interact with your medicine.  What should I watch for while using this medicine?  Visit your health care provider for regular checks on your progress. Tell your health care provider if your symptoms do not start to get better or if they get worse.  Do not take other medicines that contain aspirin, ibuprofen, or naproxen with this medicine. Side effects such as stomach upset, nausea, or ulcers may be more likely to occur. Many non-prescription medicines contain aspirin, ibuprofen, or naproxen. Always read labels carefully.  This medicine can cause serious ulcers and bleeding in the stomach. It can happen with no warning. Smoking, drinking alcohol, older age, and poor health can also increase risks. Call your health care provider right away if you have stomach pain or blood in  your vomit or stool.  This medicine does not prevent a heart attack or stroke. This medicine may increase the chance of a heart attack or stroke. The chance may increase the longer you use this medicine or if you have heart disease. If you take aspirin to prevent a heart attack or stroke, talk to your health care provider about using this medicine.  Alcohol may interfere with the effect of this medicine. Avoid alcoholic drinks.  This medicine may cause serious skin reactions. They can happen weeks to months after starting the medicine. Contact your health care provider right away if you notice fevers or flu-like symptoms with a rash. The rash may be red or purple and then turn into blisters or peeling of the skin. Or, you might notice a red rash with swelling of the face, lips or lymph nodes in your neck or under your arms.  Talk to your health care provider if you are pregnant before taking this medicine. Taking this medicine between weeks 20 and 30 of pregnancy may harm your unborn baby. Your health care provider will monitor you closely if you need to take it. After 30 weeks of pregnancy, do not take this medicine.  You may get drowsy or dizzy. Do not drive, use machinery, or do anything that needs mental alertness until you know how this medicine affects you. Do not stand up or sit up quickly, especially if you are an older patient. This reduces the risk of dizzy or fainting spells.  Be careful brushing or flossing your teeth or using a toothpick because you may get an infection or bleed more easily. If you have any dental work done, tell your dentist you are receiving this medicine.  This medicine may make it more difficult to get pregnant. Talk to your health care provider if you are concerned about your fertility.  What side effects may I notice from receiving this medicine?  Side effects that you should report to your doctor or health care professional as soon as possible:  · allergic reactions (skin rash,  itching or hives; swelling of the face, lips, or tongue)  · bleeding (bloody or black, tarry stools; red or dark brown urine; spitting up blood or brown material that looks like coffee grounds; red spots on the skin; unusual bruising or bleeding from the eyes, gums, or nose)  · blood clot (chest pain; shortness of breath; pain, swelling, or warmth in the leg)  · general ill feeling or flu-like symptoms  · high potassium levels (chest pain; fast, irregular heartbeat; muscle weakness)  · kidney injury (trouble passing urine or change in the amount of urine)  · light-colored stool  · liver injury (dark yellow or brown urine; general ill feeling or flu-like symptoms; loss of appetite, right upper belly pain; unusually weak or tired, yellowing of the eyes or skin)  · low red blood cell counts (trouble breathing; feeling faint; lightheaded, falls; unusually weak or tired)  · rash, fever, and swollen lymph nodes  · redness, blistering, peeling, or loosening of the skin, including inside the mouth  · stroke (changes in vision; confusion; trouble speaking or understanding; severe headaches; sudden numbness or weakness of the face, arm or leg; trouble walking; dizziness; loss of balance or coordination)  Side effects that usually do not require medical attention (report to your doctor or health care professional if they continue or are bothersome):  · constipation  · diarrhea  · dizziness  · gas  · headache  · nausea, vomiting  This list may not describe all possible side effects. Call your doctor for medical advice about side effects. You may report side effects to FDA at 9-630-FDA-0810.  Where should I keep my medicine?  Keep out of the reach of children and pets.  Store at room temperature between 20 and 25 degrees C (68 and 77 degrees F). Protect from moisture. Keep the container tightly closed.  Get rid of any unused medicine after the expiration date.  To get rid of medicines that are no longer needed or have  :  · Take the medicine to a medicine take-back program. Check with your pharmacy or law enforcement to find a location.  · If you cannot return the medicine, check the label or package insert to see if the medicine should be thrown out in the garbage or flushed down the toilet. If you are not sure, ask your health care provider. If it is safe to put it in the trash, empty the medicine out of the container. Mix the medicine with cat litter, dirt, coffee grounds, or other unwanted substance. Seal the mixture in a bag or container. Put it in the trash.  NOTE: This sheet is a summary. It may not cover all possible information. If you have questions about this medicine, talk to your doctor, pharmacist, or health care provider.  ©  [employer] (2020 07:39:13)

## 2022-02-01 DIAGNOSIS — M25.562 ACUTE PAIN OF LEFT KNEE: ICD-10-CM

## 2022-02-01 DIAGNOSIS — M17.12 OSTEOARTHRITIS OF LEFT KNEE, UNSPECIFIED OSTEOARTHRITIS TYPE: Primary | ICD-10-CM

## 2022-02-17 RX ORDER — DULOXETIN HYDROCHLORIDE 60 MG/1
CAPSULE, DELAYED RELEASE ORAL
Qty: 90 CAPSULE | Refills: 0 | Status: SHIPPED | OUTPATIENT
Start: 2022-02-17

## 2022-03-08 ENCOUNTER — OFFICE VISIT (OUTPATIENT)
Dept: ORTHOPEDIC SURGERY | Facility: CLINIC | Age: 52
End: 2022-03-08

## 2022-03-08 VITALS — HEIGHT: 70 IN | TEMPERATURE: 98.3 F | WEIGHT: 280 LBS | BODY MASS INDEX: 40.09 KG/M2

## 2022-03-08 DIAGNOSIS — M17.0 PRIMARY OSTEOARTHRITIS OF BOTH KNEES: Primary | ICD-10-CM

## 2022-03-08 PROCEDURE — 99204 OFFICE O/P NEW MOD 45 MIN: CPT | Performed by: PHYSICIAN ASSISTANT

## 2022-03-08 PROCEDURE — 20610 DRAIN/INJ JOINT/BURSA W/O US: CPT | Performed by: PHYSICIAN ASSISTANT

## 2022-03-08 RX ADMIN — METHYLPREDNISOLONE ACETATE 160 MG: 80 INJECTION, SUSPENSION INTRA-ARTICULAR; INTRALESIONAL; INTRAMUSCULAR; SOFT TISSUE at 15:58

## 2022-03-08 RX ADMIN — LIDOCAINE HYDROCHLORIDE 2 ML: 10 INJECTION, SOLUTION EPIDURAL; INFILTRATION; INTRACAUDAL; PERINEURAL at 15:58

## 2022-03-11 ENCOUNTER — PATIENT ROUNDING (BHMG ONLY) (OUTPATIENT)
Dept: ORTHOPEDIC SURGERY | Facility: CLINIC | Age: 52
End: 2022-03-11

## 2022-03-11 NOTE — PROGRESS NOTES
March 11, 2022    A [employer] message has been sent to the patient for PATIENT ROUNDING with [facility]

## 2022-03-20 PROBLEM — M17.0 PRIMARY OSTEOARTHRITIS OF BOTH KNEES: Status: ACTIVE | Noted: 2022-03-20

## 2022-03-20 RX ORDER — METHYLPREDNISOLONE ACETATE 80 MG/ML
160 INJECTION, SUSPENSION INTRA-ARTICULAR; INTRALESIONAL; INTRAMUSCULAR; SOFT TISSUE
Status: COMPLETED | OUTPATIENT
Start: 2022-03-08 | End: 2022-03-08

## 2022-03-20 RX ORDER — LIDOCAINE HYDROCHLORIDE 10 MG/ML
2 INJECTION, SOLUTION EPIDURAL; INFILTRATION; INTRACAUDAL; PERINEURAL
Status: COMPLETED | OUTPATIENT
Start: 2022-03-08 | End: 2022-03-08

## 2022-05-16 DIAGNOSIS — I48.91 ATRIAL FIBRILLATION, UNSPECIFIED TYPE: ICD-10-CM

## 2022-05-16 DIAGNOSIS — I10 ESSENTIAL HYPERTENSION: ICD-10-CM

## 2022-05-16 DIAGNOSIS — Z98.890 HISTORY OF RADIOFREQUENCY ABLATION PROCEDURE FOR CARDIAC ARRHYTHMIA: ICD-10-CM

## 2022-05-16 RX ORDER — METOPROLOL SUCCINATE 100 MG/1
100 TABLET, EXTENDED RELEASE ORAL DAILY
Qty: 90 TABLET | Refills: 0 | Status: SHIPPED | OUTPATIENT
Start: 2022-05-16

## 2022-05-16 RX ORDER — DULOXETIN HYDROCHLORIDE 20 MG/1
CAPSULE, DELAYED RELEASE ORAL
Qty: 30 CAPSULE | Refills: 0 | Status: SHIPPED | OUTPATIENT
Start: 2022-05-16